# Patient Record
Sex: MALE | Race: WHITE | NOT HISPANIC OR LATINO | Employment: UNEMPLOYED | URBAN - METROPOLITAN AREA
[De-identification: names, ages, dates, MRNs, and addresses within clinical notes are randomized per-mention and may not be internally consistent; named-entity substitution may affect disease eponyms.]

---

## 2017-10-04 ENCOUNTER — HISTORICAL (OUTPATIENT)
Dept: LAB | Facility: HOSPITAL | Age: 62
End: 2017-10-04

## 2017-10-04 LAB
ABS NEUT (OLG): 4.4 X10(3)/MCL (ref 1.5–6.9)
ALBUMIN SERPL-MCNC: 3.7 GM/DL (ref 3.4–5)
ALBUMIN/GLOB SERPL: 0.9 RATIO
ALP SERPL-CCNC: 46 UNIT/L (ref 30–113)
ALT SERPL-CCNC: 92 UNIT/L (ref 10–45)
AST SERPL-CCNC: 48 UNIT/L (ref 15–37)
BILIRUB SERPL-MCNC: 0.4 MG/DL (ref 0.1–0.9)
BILIRUBIN DIRECT+TOT PNL SERPL-MCNC: 0.1 MG/DL (ref 0–0.3)
BILIRUBIN DIRECT+TOT PNL SERPL-MCNC: 0.3 MG/DL
BUN SERPL-MCNC: 14 MG/DL (ref 10–20)
CALCIUM SERPL-MCNC: 8.8 MG/DL (ref 8–10.5)
CHLORIDE SERPL-SCNC: 99 MMOL/L (ref 100–108)
CO2 SERPL-SCNC: 31 MMOL/L (ref 21–35)
CREAT SERPL-MCNC: 1.33 MG/DL (ref 0.7–1.3)
ERYTHROCYTE [DISTWIDTH] IN BLOOD BY AUTOMATED COUNT: 13.7 % (ref 11.5–17)
GLOBULIN SER-MCNC: 3.9 GM/DL
GLUCOSE SERPL-MCNC: 147 MG/DL (ref 75–116)
HCT VFR BLD AUTO: 41.8 % (ref 42–52)
HGB BLD-MCNC: 14.1 GM/DL (ref 14–18)
MCH RBC QN AUTO: 30 PG (ref 27–34)
MCHC RBC AUTO-ENTMCNC: 34 GM/DL (ref 31–36)
MCV RBC AUTO: 90 FL (ref 80–99)
PLATELET # BLD AUTO: 183 X10(3)/MCL (ref 140–400)
PMV BLD AUTO: 12 FL (ref 6.8–10)
POTASSIUM SERPL-SCNC: 2.9 MMOL/L (ref 3.6–5.2)
PROT SERPL-MCNC: 7.6 GM/DL (ref 6.4–8.2)
RBC # BLD AUTO: 4.66 X10(6)/MCL (ref 4.7–6.1)
SODIUM SERPL-SCNC: 139 MMOL/L (ref 135–145)
WBC # SPEC AUTO: 7.9 X10(3)/MCL (ref 4.5–11.5)

## 2018-10-25 ENCOUNTER — HISTORICAL (OUTPATIENT)
Dept: RADIOLOGY | Facility: HOSPITAL | Age: 63
End: 2018-10-25

## 2018-12-12 ENCOUNTER — HISTORICAL (OUTPATIENT)
Dept: LAB | Facility: HOSPITAL | Age: 63
End: 2018-12-12

## 2018-12-12 LAB
ABS NEUT (OLG): 4.8 X10(3)/MCL (ref 1.5–6.9)
ALBUMIN SERPL-MCNC: 3.6 GM/DL (ref 3.4–5)
ALBUMIN/GLOB SERPL: 1 RATIO
ALP SERPL-CCNC: 41 UNIT/L (ref 30–113)
ALT SERPL-CCNC: 41 UNIT/L (ref 10–45)
AST SERPL-CCNC: 22 UNIT/L (ref 15–37)
BILIRUB SERPL-MCNC: 0.5 MG/DL (ref 0.1–0.9)
BILIRUBIN DIRECT+TOT PNL SERPL-MCNC: 0.2 MG/DL (ref 0–0.3)
BILIRUBIN DIRECT+TOT PNL SERPL-MCNC: 0.3 MG/DL
BUN SERPL-MCNC: 18 MG/DL (ref 10–20)
CALCIUM SERPL-MCNC: 8.9 MG/DL (ref 8–10.5)
CHLORIDE SERPL-SCNC: 99 MMOL/L (ref 100–108)
CHOLEST SERPL-MCNC: 231 MG/DL (ref 140–200)
CHOLEST/HDLC SERPL: 4 MG/DL (ref 0–8)
CO2 SERPL-SCNC: 33 MMOL/L (ref 21–35)
CREAT SERPL-MCNC: 1.33 MG/DL (ref 0.7–1.3)
ERYTHROCYTE [DISTWIDTH] IN BLOOD BY AUTOMATED COUNT: 13.7 % (ref 11.5–17)
GLOBULIN SER-MCNC: 3.7 GM/DL
GLUCOSE SERPL-MCNC: 130 MG/DL (ref 75–116)
HCT VFR BLD AUTO: 44.8 % (ref 42–52)
HDLC SERPL-MCNC: 58 MG/DL (ref 35–59)
HGB BLD-MCNC: 14.5 GM/DL (ref 14–18)
LDLC SERPL CALC-MCNC: 136 MG/DL (ref 100–129)
MCH RBC QN AUTO: 30 PG (ref 27–34)
MCHC RBC AUTO-ENTMCNC: 32 GM/DL (ref 31–36)
MCV RBC AUTO: 93 FL (ref 80–99)
PLATELET # BLD AUTO: 178 X10(3)/MCL (ref 140–400)
PMV BLD AUTO: 11.9 FL (ref 6.8–10)
POTASSIUM SERPL-SCNC: 3.3 MMOL/L (ref 3.6–5.2)
PROT SERPL-MCNC: 7.3 GM/DL (ref 6.4–8.2)
RBC # BLD AUTO: 4.82 X10(6)/MCL (ref 4.7–6.1)
SODIUM SERPL-SCNC: 140 MMOL/L (ref 135–145)
TRIGL SERPL-MCNC: 323 MG/DL (ref 35–150)
VLDLC SERPL CALC-MCNC: 65 MG/DL
WBC # SPEC AUTO: 8.4 X10(3)/MCL (ref 4.5–11.5)

## 2018-12-21 ENCOUNTER — HISTORICAL (OUTPATIENT)
Dept: ADMINISTRATIVE | Facility: HOSPITAL | Age: 63
End: 2018-12-21

## 2019-08-16 ENCOUNTER — HISTORICAL (OUTPATIENT)
Dept: SURGERY | Facility: HOSPITAL | Age: 64
End: 2019-08-16

## 2019-12-23 ENCOUNTER — HISTORICAL (OUTPATIENT)
Dept: LAB | Facility: HOSPITAL | Age: 64
End: 2019-12-23

## 2019-12-23 LAB
ALBUMIN SERPL-MCNC: 3.4 GM/DL (ref 3.4–5)
ALBUMIN/GLOB SERPL: 0.9 RATIO
ALP SERPL-CCNC: 38 UNIT/L (ref 30–113)
ALT SERPL-CCNC: 53 UNIT/L (ref 10–45)
AST SERPL-CCNC: 26 UNIT/L (ref 15–37)
BILIRUB SERPL-MCNC: 0.2 MG/DL (ref 0.1–0.9)
BILIRUBIN DIRECT+TOT PNL SERPL-MCNC: 0.1 MG/DL
BILIRUBIN DIRECT+TOT PNL SERPL-MCNC: 0.1 MG/DL (ref 0–0.3)
BUN SERPL-MCNC: 12 MG/DL (ref 10–20)
CALCIUM SERPL-MCNC: 8.9 MG/DL (ref 8–10.5)
CHLORIDE SERPL-SCNC: 104 MMOL/L (ref 100–108)
CHOLEST SERPL-MCNC: 245 MG/DL (ref 140–200)
CHOLEST/HDLC SERPL: 4 MG/DL (ref 0–8)
CO2 SERPL-SCNC: 31 MMOL/L (ref 21–35)
CREAT SERPL-MCNC: 0.92 MG/DL (ref 0.7–1.3)
GLOBULIN SER-MCNC: 3.6 GM/DL
GLUCOSE SERPL-MCNC: 99 MG/DL (ref 75–116)
HDLC SERPL-MCNC: 58 MG/DL (ref 35–59)
LDLC SERPL CALC-MCNC: 170 MG/DL (ref 100–129)
POTASSIUM SERPL-SCNC: 3.6 MMOL/L (ref 3.6–5.2)
PROT SERPL-MCNC: 7 GM/DL (ref 6.4–8.2)
PSA SERPL-MCNC: 2.25 NG/ML (ref 0–4)
SODIUM SERPL-SCNC: 144 MMOL/L (ref 135–145)
TRIGL SERPL-MCNC: 196 MG/DL (ref 35–150)
VLDLC SERPL CALC-MCNC: 39 MG/DL

## 2019-12-30 ENCOUNTER — HISTORICAL (OUTPATIENT)
Dept: LAB | Facility: HOSPITAL | Age: 64
End: 2019-12-30

## 2019-12-30 LAB
BUN SERPL-MCNC: 18 MG/DL (ref 10–20)
CALCIUM SERPL-MCNC: 9.2 MG/DL (ref 8–10.5)
CHLORIDE SERPL-SCNC: 101 MMOL/L (ref 100–108)
CO2 SERPL-SCNC: 34 MMOL/L (ref 21–35)
CREAT SERPL-MCNC: 0.98 MG/DL (ref 0.7–1.3)
CREAT/UREA NIT SERPL: 18
GLUCOSE SERPL-MCNC: 143 MG/DL (ref 75–116)
POTASSIUM SERPL-SCNC: 3.2 MMOL/L (ref 3.6–5.2)
SODIUM SERPL-SCNC: 140 MMOL/L (ref 135–145)

## 2020-11-09 ENCOUNTER — HISTORICAL (OUTPATIENT)
Dept: LAB | Facility: HOSPITAL | Age: 65
End: 2020-11-09

## 2020-11-09 LAB
ABS NEUT (OLG): 3.6 X10(3)/MCL (ref 1.5–6.9)
ALBUMIN SERPL-MCNC: 3.9 GM/DL (ref 3.4–4.8)
ALBUMIN/GLOB SERPL: 1.1 RATIO (ref 1.1–2)
ALP SERPL-CCNC: 39 UNIT/L (ref 40–150)
ALT SERPL-CCNC: 42 UNIT/L (ref 0–55)
AST SERPL-CCNC: 26 UNIT/L (ref 5–34)
BILIRUB SERPL-MCNC: 0.6 MG/DL
BILIRUBIN DIRECT+TOT PNL SERPL-MCNC: 0.2 MG/DL (ref 0–0.5)
BILIRUBIN DIRECT+TOT PNL SERPL-MCNC: 0.4 MG/DL (ref 0–0.8)
BUN SERPL-MCNC: 14 MG/DL (ref 8.4–25.7)
CALCIUM SERPL-MCNC: 9.3 MG/DL (ref 8.8–10)
CHLORIDE SERPL-SCNC: 96 MMOL/L (ref 98–107)
CHOLEST SERPL-MCNC: 250 MG/DL
CHOLEST/HDLC SERPL: 5 {RATIO} (ref 0–5)
CO2 SERPL-SCNC: 34 MMOL/L (ref 23–31)
CREAT SERPL-MCNC: 1.06 MG/DL (ref 0.73–1.18)
ERYTHROCYTE [DISTWIDTH] IN BLOOD BY AUTOMATED COUNT: 13.2 % (ref 11.5–17)
GLOBULIN SER-MCNC: 3.6 GM/DL (ref 2.4–3.5)
GLUCOSE SERPL-MCNC: 118 MG/DL (ref 82–115)
HCT VFR BLD AUTO: 46.9 % (ref 42–52)
HDLC SERPL-MCNC: 49 MG/DL (ref 35–60)
HGB BLD-MCNC: 15.5 GM/DL (ref 14–18)
LDLC SERPL CALC-MCNC: 98 MG/DL (ref 50–140)
MCH RBC QN AUTO: 29 PG (ref 27–34)
MCHC RBC AUTO-ENTMCNC: 33 GM/DL (ref 31–36)
MCV RBC AUTO: 89 FL (ref 80–99)
PLATELET # BLD AUTO: 146 X10(3)/MCL (ref 140–400)
PMV BLD AUTO: 11.7 FL (ref 6.8–10)
POTASSIUM SERPL-SCNC: 3.3 MMOL/L (ref 3.5–5.1)
PROT SERPL-MCNC: 7.5 GM/DL (ref 5.8–7.6)
RBC # BLD AUTO: 5.29 X10(6)/MCL (ref 4.7–6.1)
SODIUM SERPL-SCNC: 140 MMOL/L (ref 136–145)
TRIGL SERPL-MCNC: 515 MG/DL (ref 34–140)
VLDLC SERPL CALC-MCNC: 103 MG/DL
WBC # SPEC AUTO: 7 X10(3)/MCL (ref 4.5–11.5)

## 2022-01-12 ENCOUNTER — HISTORICAL (OUTPATIENT)
Dept: RADIOLOGY | Facility: HOSPITAL | Age: 67
End: 2022-01-12

## 2022-03-15 ENCOUNTER — HISTORICAL (OUTPATIENT)
Dept: RESPIRATORY THERAPY | Facility: HOSPITAL | Age: 67
End: 2022-03-15

## 2022-03-15 ENCOUNTER — HISTORICAL (OUTPATIENT)
Dept: ADMINISTRATIVE | Facility: HOSPITAL | Age: 67
End: 2022-03-15

## 2022-03-15 ENCOUNTER — HISTORICAL (OUTPATIENT)
Dept: RADIOLOGY | Facility: HOSPITAL | Age: 67
End: 2022-03-15

## 2022-03-23 ENCOUNTER — HISTORICAL (OUTPATIENT)
Dept: LAB | Facility: HOSPITAL | Age: 67
End: 2022-03-23

## 2022-03-23 LAB
ABS NEUT (OLG): 2.6 (ref 1.5–6.9)
ALBUMIN SERPL-MCNC: 3.9 G/DL (ref 3.4–4.8)
ALBUMIN/GLOB SERPL: 1.3 {RATIO} (ref 1.1–2)
ALP SERPL-CCNC: 31 U/L (ref 40–150)
ALT SERPL-CCNC: 34 U/L (ref 0–55)
AST SERPL-CCNC: 28 U/L (ref 5–34)
BASOPHILS # BLD AUTO: 0 10*3/UL (ref 0–0.1)
BASOPHILS NFR BLD AUTO: 1 % (ref 0–1)
BILIRUB SERPL-MCNC: 0.4 MG/DL
BILIRUBIN DIRECT+TOT PNL SERPL-MCNC: 0.2 (ref 0–0.5)
BILIRUBIN DIRECT+TOT PNL SERPL-MCNC: 0.2 (ref 0–0.8)
BUN SERPL-MCNC: 18 MG/DL (ref 8.4–25.7)
CALCIUM SERPL-MCNC: 9.4 MG/DL (ref 8.7–10.5)
CHLORIDE SERPL-SCNC: 103 MMOL/L (ref 98–107)
CHOLEST SERPL-MCNC: 226 MG/DL
CHOLEST/HDLC SERPL: 5 {RATIO} (ref 0–5)
CO2 SERPL-SCNC: 36 MMOL/L (ref 23–31)
CREAT SERPL-MCNC: 1.18 MG/DL (ref 0.73–1.18)
EOSINOPHIL # BLD AUTO: 0.4 10*3/UL (ref 0–0.6)
EOSINOPHIL NFR BLD AUTO: 7 % (ref 0–5)
ERYTHROCYTE [DISTWIDTH] IN BLOOD BY AUTOMATED COUNT: 13 % (ref 11.5–17)
GLOBULIN SER-MCNC: 3.1 G/DL (ref 2.4–3.5)
GLUCOSE SERPL-MCNC: 97 MG/DL (ref 82–115)
HCT VFR BLD AUTO: 44.7 % (ref 42–52)
HDLC SERPL-MCNC: 46 MG/DL (ref 35–60)
HEMOLYSIS INTERF INDEX SERPL-ACNC: 1
HGB BLD-MCNC: 14.5 G/DL (ref 14–18)
ICTERIC INTERF INDEX SERPL-ACNC: 0
IMM GRANULOCYTES # BLD AUTO: 0.01 10*3/UL (ref 0–0.02)
IMM GRANULOCYTES NFR BLD AUTO: 0.2 % (ref 0–0.43)
LDLC SERPL CALC-MCNC: 126 MG/DL (ref 50–140)
LIPEMIC INTERF INDEX SERPL-ACNC: 4
LYMPHOCYTES # BLD AUTO: 2.5 10*3/UL (ref 0.5–4.1)
LYMPHOCYTES NFR BLD AUTO: 41 % (ref 15–40)
MANUAL DIFF? (OHS): NO
MCH RBC QN AUTO: 30 PG (ref 27–34)
MCHC RBC AUTO-ENTMCNC: 32 G/DL (ref 31–36)
MCV RBC AUTO: 92 FL (ref 80–99)
MONOCYTES # BLD AUTO: 0.6 10*3/UL (ref 0–1.1)
MONOCYTES NFR BLD AUTO: 10 % (ref 4–12)
NEUTROPHILS # BLD AUTO: 2.6 10*3/UL (ref 1.5–6.9)
NEUTROPHILS NFR BLD AUTO: 42 % (ref 43–75)
PLATELET # BLD AUTO: 162 10*3/UL (ref 140–400)
PMV BLD AUTO: 12.2 FL (ref 6.8–10)
POTASSIUM SERPL-SCNC: 3.6 MMOL/L (ref 3.5–5.1)
PROT SERPL-MCNC: 7 G/DL (ref 5.8–7.6)
PSA SERPL-MCNC: 2.36 NG/ML
RBC # BLD AUTO: 4.86 10*6/UL (ref 4.7–6.1)
SODIUM SERPL-SCNC: 146 MMOL/L (ref 136–145)
TESTOST SERPL-MCNC: 365.19 NG/DL (ref 220.91–715.81)
TRIGL SERPL-MCNC: 272 MG/DL (ref 34–140)
TSH SERPL-ACNC: 1.79 M[IU]/L (ref 0.35–4.94)
VLDLC SERPL CALC-MCNC: 54 MG/DL
WBC # SPEC AUTO: 6.2 10*3/UL (ref 4.5–11.5)

## 2022-04-11 ENCOUNTER — HISTORICAL (OUTPATIENT)
Dept: ADMINISTRATIVE | Facility: HOSPITAL | Age: 67
End: 2022-04-11

## 2022-04-27 VITALS
HEIGHT: 70 IN | BODY MASS INDEX: 31.07 KG/M2 | OXYGEN SATURATION: 97 % | WEIGHT: 217 LBS | SYSTOLIC BLOOD PRESSURE: 126 MMHG | DIASTOLIC BLOOD PRESSURE: 70 MMHG

## 2022-04-30 NOTE — OP NOTE
DATE OF SURGERY:    08/16/2019    SURGEON:  Yariel Singer MD    PREOPERATIVE DIAGNOSIS:  Lumbar spondylosis, lumbosacral neuritis.    POSTOPERATIVE DIAGNOSIS:  Lumbar spondylosis, lumbosacral neuritis.    PROCEDURE:  Lumbar epidural steroid injection, caudal approach with catheter placement.    PROCEDURE IN DETAIL:  After proper consent, patient was brought to procedure room.  IV access was maintained.  Cardiac monitors were placed and operational.  Patient was then placed in a prone position, and the area over the sacral hiatus was prepped and draped in wide sterile fashion.  After 3 cc of 1% lidocaine skin anesthesia, an 18-gauge Touhy needle was advanced to the epidural space under fluoroscopic guidance.  Placement verified using fluoroscopy and Omnipaque.  Trivedi catheter was then passed without difficulty to L5-S1.  Epidurogram was performed.  No sign of intravascular, intrathecal, or perineural injection.  Images were saved.  A catheter was aspirated negatively and tested with 2 cc of 1% lidocaine with epi without sequelae.  After a period of observation, an injectate consisting of 40 mg Depo-Medrol, 5 mg Decadron, 1 cc of 0.25% bupivacaine, and 8 cc normal saline was prepared.  This was injected in a slow incremental fashion.  Patient tolerated procedure well.  Catheter was pulled.  Tip was intact.  Needle path was disrupted.  The patient was sent to the holding area for 30 minutes of observation.  Discharged to home in a stable condition.        ______________________________  Yariel Singer MD    JTG/UV  DD:  10/03/2019  Time:  01:53PM  DT:  10/03/2019  Time:  02:08PM  Job #:  [id number]

## 2022-04-30 NOTE — OP NOTE
DATE OF SURGERY:        SURGEON:  Yariel Singer MD    PREOPERATIVE DIAGNOSIS:  Lumbar degenerative disk disease.    POSTOPERATIVE DIAGNOSIS:  Lumbar degenerative disk disease.    PROCEDURE:  Lumbar epidural steroid injection caudal approach with catheter placement.    PROCEDURE IN DETAIL:  After appropriate consent, patient was brought to procedure room.  IV access was maintained.  Cardiac monitors placed and operational.  The patient was then placed in a prone position.  Area over the sacral hiatus was prepped and draped in wide sterile fashion.  After 5 cc of 1% lidocaine for skin anesthesia, an 18-gauge Touhy needle was advanced to the epidural space under fluoroscopic guidance.  Placement verified using fluoroscopy and Omnipaque.  Trivedi catheter was then passed without difficulty to L5-S1.  Epidurogram was performed.  Bilateral flow seen.  No sign of intravascular intrathecal perineural injection.  Catheter was aspirated negatively and tested with 2 cc 1.5% lidocaine with epinephrine without sequelae.  After a period of observation, an injectate consisting of 80 mg Depo-Medrol, 1 cc of 0.25% bupivacaine and 6 cc of normal saline was prepared.  This was injected in a slow and incremental fashion.  Patient reports pressure paresthesias actually reproduced his pain but it dissipated rapidly.  Catheter was pulled.  Tip was intact.  Needle path disrupted.  Patient sent to the holding area for 30 minutes of observation.  Discharged to home in stable condition.        ______________________________  Yariel Singer MD    JTG/UW  DD:  12/21/2018  Time:  09:36AM  DT:  12/21/2018  Time:  01:42PM  Job #:  [id number]

## 2022-11-29 DIAGNOSIS — I82.593 CHRONIC EMBOLISM AND THROMBOSIS OF OTHER SPECIFIED DEEP VEIN OF LOWER EXTREMITY, BILATERAL: Primary | ICD-10-CM

## 2023-02-02 ENCOUNTER — LAB VISIT (OUTPATIENT)
Dept: LAB | Facility: HOSPITAL | Age: 68
End: 2023-02-02
Attending: FAMILY MEDICINE
Payer: MEDICARE

## 2023-02-02 DIAGNOSIS — E78.5 HYPERLIPIDEMIA, UNSPECIFIED HYPERLIPIDEMIA TYPE: Primary | ICD-10-CM

## 2023-02-02 DIAGNOSIS — I11.9 MALIGNANT HYPERTENSIVE HEART DISEASE WITHOUT HEART FAILURE: ICD-10-CM

## 2023-02-02 LAB
ALBUMIN SERPL-MCNC: 3.3 G/DL (ref 3.4–4.8)
ALBUMIN/GLOB SERPL: 1 RATIO (ref 1.1–2)
ALP SERPL-CCNC: 39 UNIT/L (ref 40–150)
ALT SERPL-CCNC: 22 UNIT/L (ref 0–55)
AST SERPL-CCNC: 16 UNIT/L (ref 5–34)
BASOPHILS # BLD AUTO: 0.03 X10(3)/MCL (ref 0–0.2)
BASOPHILS NFR BLD AUTO: 0.3 %
BILIRUBIN DIRECT+TOT PNL SERPL-MCNC: 1.2 MG/DL
BUN SERPL-MCNC: 22 MG/DL (ref 8.4–25.7)
CALCIUM SERPL-MCNC: 9.4 MG/DL (ref 8.8–10)
CHLORIDE SERPL-SCNC: 96 MMOL/L (ref 98–107)
CHOLEST SERPL-MCNC: 150 MG/DL
CHOLEST/HDLC SERPL: 3 {RATIO} (ref 0–5)
CO2 SERPL-SCNC: 33 MMOL/L (ref 23–31)
CREAT SERPL-MCNC: 1.28 MG/DL (ref 0.73–1.18)
EOSINOPHIL # BLD AUTO: 0.28 X10(3)/MCL (ref 0–0.9)
EOSINOPHIL NFR BLD AUTO: 2.6 %
ERYTHROCYTE [DISTWIDTH] IN BLOOD BY AUTOMATED COUNT: 14.6 % (ref 11.5–17)
GFR SERPLBLD CREATININE-BSD FMLA CKD-EPI: >60 MLS/MIN/1.73/M2
GLOBULIN SER-MCNC: 3.2 GM/DL (ref 2.4–3.5)
GLUCOSE SERPL-MCNC: 98 MG/DL (ref 82–115)
HCT VFR BLD AUTO: 47.8 % (ref 42–52)
HDLC SERPL-MCNC: 55 MG/DL (ref 35–60)
HGB BLD-MCNC: 15.2 GM/DL (ref 14–18)
IMM GRANULOCYTES # BLD AUTO: 0.04 X10(3)/MCL (ref 0–0.04)
IMM GRANULOCYTES NFR BLD AUTO: 0.4 %
LDLC SERPL CALC-MCNC: 77 MG/DL (ref 50–140)
LYMPHOCYTES # BLD AUTO: 3.16 X10(3)/MCL (ref 0.6–4.6)
LYMPHOCYTES NFR BLD AUTO: 28.9 %
MCH RBC QN AUTO: 27.7 PG
MCHC RBC AUTO-ENTMCNC: 31.8 MG/DL (ref 33–36)
MCV RBC AUTO: 87.2 FL (ref 80–94)
MONOCYTES # BLD AUTO: 1.11 X10(3)/MCL (ref 0.1–1.3)
MONOCYTES NFR BLD AUTO: 10.1 %
NEUTROPHILS # BLD AUTO: 6.33 X10(3)/MCL (ref 2.1–9.2)
NEUTROPHILS NFR BLD AUTO: 57.7 %
PLATELET # BLD AUTO: 159 X10(3)/MCL (ref 130–400)
PMV BLD AUTO: 12.1 FL (ref 7.4–10.4)
POTASSIUM SERPL-SCNC: 4.2 MMOL/L (ref 3.5–5.1)
PROT SERPL-MCNC: 6.5 GM/DL (ref 5.8–7.6)
RBC # BLD AUTO: 5.48 X10(6)/MCL (ref 4.7–6.1)
SODIUM SERPL-SCNC: 138 MMOL/L (ref 136–145)
TRIGL SERPL-MCNC: 88 MG/DL (ref 34–140)
TSH SERPL-ACNC: 1.77 UIU/ML (ref 0.35–4.94)
VLDLC SERPL CALC-MCNC: 18 MG/DL
WBC # SPEC AUTO: 11 X10(3)/MCL (ref 4.5–11.5)

## 2023-02-02 PROCEDURE — 80053 COMPREHEN METABOLIC PANEL: CPT

## 2023-02-02 PROCEDURE — 85025 COMPLETE CBC W/AUTO DIFF WBC: CPT

## 2023-02-02 PROCEDURE — 84443 ASSAY THYROID STIM HORMONE: CPT

## 2023-02-02 PROCEDURE — 80061 LIPID PANEL: CPT

## 2023-02-02 PROCEDURE — 36415 COLL VENOUS BLD VENIPUNCTURE: CPT

## 2023-02-07 ENCOUNTER — HOSPITAL ENCOUNTER (OUTPATIENT)
Dept: RADIOLOGY | Facility: HOSPITAL | Age: 68
Discharge: HOME | End: 2023-02-07
Attending: FAMILY MEDICINE
Payer: MEDICARE

## 2023-02-07 DIAGNOSIS — I82.593 CHRONIC EMBOLISM AND THROMBOSIS OF OTHER SPECIFIED DEEP VEIN OF LOWER EXTREMITY, BILATERAL: ICD-10-CM

## 2023-02-07 PROCEDURE — 93925 LOWER EXTREMITY STUDY: CPT | Mod: TC

## 2023-02-23 ENCOUNTER — HOSPITAL ENCOUNTER (OUTPATIENT)
Dept: WOUND CARE | Facility: HOSPITAL | Age: 68
Discharge: HOME | End: 2023-02-23
Attending: FAMILY MEDICINE
Payer: MEDICARE

## 2023-02-23 VITALS
DIASTOLIC BLOOD PRESSURE: 79 MMHG | BODY MASS INDEX: 29.35 KG/M2 | SYSTOLIC BLOOD PRESSURE: 138 MMHG | HEART RATE: 67 BPM | WEIGHT: 205 LBS | RESPIRATION RATE: 18 BRPM | HEIGHT: 70 IN

## 2023-02-23 DIAGNOSIS — S91.109A OPEN WOUND OF TOE OF LEFT FOOT: Primary | ICD-10-CM

## 2023-02-23 DIAGNOSIS — S91.301A OPEN WOUND OF RIGHT FOOT, INITIAL ENCOUNTER: ICD-10-CM

## 2023-02-23 PROCEDURE — 97597 DBRDMT OPN WND 1ST 20 CM/<: CPT

## 2023-02-23 PROCEDURE — 27000999 HC MEDICAL RECORD PHOTO DOCUMENTATION

## 2023-02-23 PROCEDURE — 99213 OFFICE O/P EST LOW 20 MIN: CPT | Mod: 25

## 2023-02-23 RX ORDER — HYDROCODONE BITARTRATE AND ACETAMINOPHEN 10; 325 MG/1; MG/1
1 TABLET ORAL NIGHTLY
COMMUNITY
Start: 2023-01-26

## 2023-02-23 RX ORDER — TIZANIDINE 4 MG/1
TABLET ORAL
COMMUNITY
Start: 2023-01-11

## 2023-02-23 RX ORDER — FUROSEMIDE 20 MG/1
TABLET ORAL
COMMUNITY

## 2023-02-23 RX ORDER — AMLODIPINE AND BENAZEPRIL HYDROCHLORIDE 10; 40 MG/1; MG/1
CAPSULE ORAL
COMMUNITY

## 2023-02-23 RX ORDER — GABAPENTIN 600 MG/1
TABLET ORAL
COMMUNITY

## 2023-02-23 RX ORDER — HYDROCHLOROTHIAZIDE 25 MG/1
TABLET ORAL
COMMUNITY

## 2023-02-23 RX ORDER — MUPIROCIN 20 MG/G
OINTMENT TOPICAL
COMMUNITY
Start: 2022-11-28

## 2023-02-23 RX ORDER — TRAZODONE HYDROCHLORIDE 100 MG/1
TABLET ORAL
COMMUNITY

## 2023-02-23 RX ORDER — POTASSIUM CHLORIDE 20 MEQ/1
TABLET, EXTENDED RELEASE ORAL
COMMUNITY

## 2023-02-23 RX ORDER — BISOPROLOL FUMARATE 5 MG/1
TABLET, FILM COATED ORAL
COMMUNITY

## 2023-02-23 RX ORDER — ROSUVASTATIN CALCIUM 5 MG/1
TABLET, COATED ORAL
COMMUNITY
Start: 2023-01-30

## 2023-02-23 RX ORDER — CELECOXIB 200 MG/1
CAPSULE ORAL
COMMUNITY

## 2023-02-23 RX ORDER — FENOFIBRATE 48 MG/1
TABLET, FILM COATED ORAL
COMMUNITY

## 2023-02-23 NOTE — PROCEDURES
Debridement    Date/Time: 2/23/2023 10:40 AM  Performed by: Ron Chairez MD  Authorized by: Ron Chairez MD     Consent Done?:  Yes (Verbal)  Local anesthesia used?: No      Wound Details:    Location:  Left foot    Location:  Left 1st Toe    Type of Debridement:  Non-excisional       Length (cm):  1       Area (sq cm):  0.4       Width (cm):  0.4       Percent Debrided (%):  80       Depth (cm):  0.3       Total Area Debrided (sq cm):  0.32    Depth of debridement:  Epidermis/Dermis    Tissue debrided:  Epidermis    Devitalized tissue debrided:  Callus, Biofilm, Exudate and Slough    Instruments:  Curette (dermal)    Additional wounds:  1    2nd Wound Details:     Location:  Right foot    Location:  Right 1st Toe    Location:  Right 1st Toe    Type of Debridement:  Non-excisional       Length (cm):  1.8       Area (sq cm):  0.9       Width (cm):  0.5       Percent Debrided (%):  80       Depth (cm):  0.2       Total Area Debrided (sq cm):  0.72    Depth of debridement:  Epidermis/Dermis    Tissue debrided:  Epidermis    Devitalized tissue debrided:  Callus, Biofilm, Exudate and Slough    Instruments:  Curette (dermal)    Bleeding:  None  Patient tolerance:  Patient tolerated the procedure well with no immediate complications
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
## 2023-03-02 ENCOUNTER — HOSPITAL ENCOUNTER (OUTPATIENT)
Dept: WOUND CARE | Facility: HOSPITAL | Age: 68
Discharge: HOME | End: 2023-03-02
Attending: FAMILY MEDICINE
Payer: MEDICARE

## 2023-03-02 VITALS
RESPIRATION RATE: 18 BRPM | HEART RATE: 58 BPM | BODY MASS INDEX: 29.35 KG/M2 | DIASTOLIC BLOOD PRESSURE: 89 MMHG | HEIGHT: 70 IN | SYSTOLIC BLOOD PRESSURE: 119 MMHG | WEIGHT: 205 LBS

## 2023-03-02 DIAGNOSIS — S91.109A OPEN WOUND OF TOE OF LEFT FOOT: Primary | ICD-10-CM

## 2023-03-02 PROCEDURE — 27000999 HC MEDICAL RECORD PHOTO DOCUMENTATION

## 2023-03-02 PROCEDURE — 99213 OFFICE O/P EST LOW 20 MIN: CPT | Mod: 25

## 2023-03-02 PROCEDURE — 97597 DBRDMT OPN WND 1ST 20 CM/<: CPT

## 2023-03-02 NOTE — PROGRESS NOTES
"   Subjective:       Patient ID: Paulo Dickerson is a 67 y.o. male.    Chief Complaint: Wound Care (Right 1st toe #1/Left 1st toe #2)    67-year-old white male, who is here for follow up of his great toe ulcers.  The right toe distal ulcer has become callus, with no open wound today.  The left great toe ulcer is very small, improvements with length and width, but no improvement with depth.  He has been using collagen.  Noticed callus to right 2nd toe which he trimmed    Review of Systems   Constitutional: Negative.    Respiratory: Negative.     Cardiovascular: Negative.    Skin:         As documented in the HPI.   All other systems reviewed and are negative.      Objective:      Vitals:    03/02/23 1121   BP: 119/89   BP Location: Right arm   Patient Position: Sitting   Pulse: (!) 58   Resp: 18   Weight: 93 kg (205 lb)   Height: 5' 10" (1.778 m)        Physical Exam  Vitals and nursing note reviewed.   Constitutional:       Appearance: Normal appearance.   Cardiovascular:      Rate and Rhythm: Normal rate.   Pulmonary:      Effort: Pulmonary effort is normal.   Skin:     General: Skin is warm and dry.      Comments: The ulceration on the right great toe has healed.  There is only callus left.  I did not do debridement today.  The callus is superficial.  The left great toe wound appears much improved, with measurements improved.  I did a selective debridement, with a martini curette.  I removed some biofilm, exudate, slough.  There was some maceration, but no callus.   Neurological:      Mental Status: He is alert.     Left plantar ulcer, post debridement    Left great toe plantar ulcer, prior to debridement    Right great toe, no open wound.       Altered Skin Integrity 02/23/23 1136 Right plantar Toe, first #1 (Active)   02/23/23 1136   Altered Skin Integrity Present on Admission - Did Patient arrive to the hospital with altered skin?: yes   Side: Right   Orientation: plantar   Location: Toe, first   Wound Number: #1 "   Is this injury device related?:    Primary Wound Type:    Description of Altered Skin Integrity:    Ankle-Brachial Index:    Pulses:    Removal Indication and Assessment:    Wound Outcome:    (Retired) Wound Length (cm):    (Retired) Wound Width (cm):    (Retired) Depth (cm):    Wound Description (Comments):    Removal Indications:    Dressing Appearance Moist drainage 03/02/23 1126   Drainage Amount Moderate 03/02/23 1126   Drainage Characteristics/Odor Serosanguineous 03/02/23 1126   Appearance Red;Maroon;Fibrin;Moist 03/02/23 1126   Tissue loss description Full thickness 03/02/23 1126   Periwound Area [place] 03/02/23 1126   Wound Edges Callused;Undefined 03/02/23 1126   Wound Length (cm) 1.2 cm 03/02/23 1126   Wound Width (cm) 0.7 cm 03/02/23 1126   Wound Depth (cm) 0.2 cm 03/02/23 1126   Wound Volume (cm^3) 0.168 cm^3 03/02/23 1126   Wound Surface Area (cm^2) 0.84 cm^2 03/02/23 1126   Care Cleansed with:;Wound cleanser 03/02/23 1126   Dressing Applied 03/02/23 1126            Altered Skin Integrity 02/23/23 1136 Left plantar Toe, first #2 (Active)   02/23/23 1136   Altered Skin Integrity Present on Admission - Did Patient arrive to the hospital with altered skin?: yes   Side: Left   Orientation: plantar   Location: Toe, first   Wound Number: #2   Is this injury device related?:    Primary Wound Type:    Description of Altered Skin Integrity:    Ankle-Brachial Index:    Pulses:    Removal Indication and Assessment:    Wound Outcome:    (Retired) Wound Length (cm):    (Retired) Wound Width (cm):    (Retired) Depth (cm):    Wound Description (Comments):    Removal Indications:    Dressing Appearance Moist drainage 03/02/23 1126   Drainage Amount Moderate 03/02/23 1126   Drainage Characteristics/Odor Serosanguineous 03/02/23 1126   Appearance Pink;White;Slough;Moist 03/02/23 1126   Tissue loss description Full thickness 03/02/23 1126   Periwound Area Moist;Pale white 03/02/23 1126   Wound Edges Open 03/02/23 1126    Wound Length (cm) 0.6 cm 03/02/23 1126   Wound Width (cm) 0.3 cm 03/02/23 1126   Wound Depth (cm) 0.3 cm 03/02/23 1126   Wound Volume (cm^3) 0.054 cm^3 03/02/23 1126   Wound Surface Area (cm^2) 0.18 cm^2 03/02/23 1126   Care Cleansed with:;Wound cleanser 03/02/23 1126   Dressing Applied 03/02/23 1126         Assessment:         ICD-10-CM ICD-9-CM   1. Open wound of toe of left foot  S91.109A 893.0         Procedures:   Excisional debridement performed:  [] Yes [x] No   Selective debridement performed:  [x] Yes [] No   Mechanical debridement performed:  [] Yes [x] No   Silver nitrate applied :    [] Yes [x] No   Labs ordered this visit:   [] Yes [x] No   Imaging ordered this visit:   [] Yes [x] No   Tissue pathology and/or culture taken:  [] Yes [x] No     Procedures:  HOME HEALTH AGENCY: none  TIMES PER WEEK/DAYS:  ORDERS:  Right plantar 1st toe #1: cleanse daily, keep open to air  Left plantar 1st toe #2: **DO NOT REMOVE BELOW STERI STRIPS (powdered collagen, adaptic)** may change silver alginate, kerlix and coban daily for drainage. On Sunday remove entire dressing, cleanse with wound cleanser, apply silver alginate to wound bed and wrap with kerlix and coban, change this dressing daily    Patient Orders:    Returned to the clinic in 1 week.

## 2023-03-02 NOTE — PROCEDURES
"Debridement    Date/Time: 3/2/2023 10:40 AM  Performed by: Ron Chairez MD  Authorized by: Ron Chairez MD     Time out: Immediately prior to procedure a "time out" was called to verify the correct patient, procedure, equipment, support staff and site/side marked as required.    Consent Done?:  Yes (Verbal)  Local anesthesia used?: No      Wound Details:    Location:  Left foot    Location:  Left 1st Toe    Type of Debridement:  Non-excisional       Length (cm):  0.6       Area (sq cm):  0.18       Width (cm):  0.3       Percent Debrided (%):  80       Depth (cm):  0.3       Total Area Debrided (sq cm):  0.14    Depth of debridement:  Epidermis/Dermis    Tissue debrided:  Epidermis    Devitalized tissue debrided:  Biofilm, Exudate and Slough    Instruments:  Curette (martini)    Bleeding:  None  Patient tolerance:  Patient tolerated the procedure well with no immediate complications  "

## 2023-03-09 ENCOUNTER — HOSPITAL ENCOUNTER (OUTPATIENT)
Dept: WOUND CARE | Facility: HOSPITAL | Age: 68
Discharge: HOME | End: 2023-03-09
Attending: FAMILY MEDICINE
Payer: MEDICARE

## 2023-03-09 VITALS
SYSTOLIC BLOOD PRESSURE: 148 MMHG | WEIGHT: 205 LBS | DIASTOLIC BLOOD PRESSURE: 82 MMHG | HEIGHT: 70 IN | HEART RATE: 64 BPM | BODY MASS INDEX: 29.35 KG/M2 | RESPIRATION RATE: 18 BRPM

## 2023-03-09 DIAGNOSIS — S91.301S: ICD-10-CM

## 2023-03-09 DIAGNOSIS — S91.109A OPEN WOUND OF TOE OF LEFT FOOT: Primary | ICD-10-CM

## 2023-03-09 PROCEDURE — 97597 DBRDMT OPN WND 1ST 20 CM/<: CPT

## 2023-03-09 PROCEDURE — 27000999 HC MEDICAL RECORD PHOTO DOCUMENTATION

## 2023-03-09 PROCEDURE — 99213 OFFICE O/P EST LOW 20 MIN: CPT | Mod: 25

## 2023-03-09 NOTE — PROGRESS NOTES
"   Subjective:       Patient ID: Paulo Dickerson is a 67 y.o. male.    Chief Complaint: Wound Care (Right plantar first toe /Left plantar first toe )    67-year-old white male, who is here for follow up of 2 open wounds, on each left and right great toes.  The wounds are improving.  The wound on the right great toe has just about healed.  There is some callus left.  The wound on the left great toe is very small, measuring smaller.  Getting ready to leave to go out of the country [place] at least 2 months.    Review of Systems   Constitutional: Negative.    Respiratory: Negative.     Cardiovascular: Negative.    Skin:         As documented in the HPI.   All other systems reviewed and are negative.      Objective:      Vitals:    03/09/23 1032   BP: (!) 148/82   BP Location: Left arm   Patient Position: Sitting   Pulse: 64   Resp: 18   Weight: 93 kg (205 lb)   Height: 5' 10" (1.778 m)        Physical Exam  Vitals reviewed.   Constitutional:       Appearance: Normal appearance.   Cardiovascular:      Rate and Rhythm: Normal rate.   Pulmonary:      Effort: Pulmonary effort is normal.   Skin:     General: Skin is warm and dry.      Comments: The wound on the right great toe has just about healed.  I used a dermal curette, and I removed some callus and a small amount of biofilm.  The left great toe is very small, with improved measurements.  I removed some callus surrounding the wound, and some biofilm, with a dermal curette.  There was no pain or bleeding.   Neurological:      Mental Status: He is alert.     Right great toe, prior to debridement    Right great toe, post debridement    Left great toe, prior to debridement    Left great toe, post debridement       Altered Skin Integrity 02/23/23 1136 Right plantar Toe, first #1 (Active)   02/23/23 1136   Altered Skin Integrity Present on Admission - Did Patient arrive to the hospital with altered skin?: yes   Side: Right   Orientation: plantar   Location: Toe, first   Wound " Number: #1   Is this injury device related?:    Primary Wound Type:    Description of Altered Skin Integrity:    Ankle-Brachial Index:    Pulses:    Removal Indication and Assessment:    Wound Outcome:    (Retired) Wound Length (cm):    (Retired) Wound Width (cm):    (Retired) Depth (cm):    Wound Description (Comments):    Removal Indications:    Dressing Appearance Open to air 03/09/23 1033   Drainage Amount None 03/09/23 1033   Appearance Dry;Fibrin;Ecchymotic 03/09/23 1033   Tissue loss description Full thickness 03/09/23 1033   Periwound Area Intact 03/09/23 1033   Wound Edges Callused 03/09/23 1033   Wound Length (cm) 0 cm 03/09/23 1053   Wound Width (cm) 0 cm 03/09/23 1053   Wound Depth (cm) 0 cm 03/09/23 1053   Wound Volume (cm^3) 0 cm^3 03/09/23 1053   Wound Surface Area (cm^2) 0 cm^2 03/09/23 1053   Care Debrided 03/09/23 1053            Altered Skin Integrity 02/23/23 1136 Left plantar Toe, first #2 (Active)   02/23/23 1136   Altered Skin Integrity Present on Admission - Did Patient arrive to the hospital with altered skin?: yes   Side: Left   Orientation: plantar   Location: Toe, first   Wound Number: #2   Is this injury device related?:    Primary Wound Type:    Description of Altered Skin Integrity:    Ankle-Brachial Index:    Pulses:    Removal Indication and Assessment:    Wound Outcome:    (Retired) Wound Length (cm):    (Retired) Wound Width (cm):    (Retired) Depth (cm):    Wound Description (Comments):    Removal Indications:    Dressing Appearance Moist drainage 03/09/23 1033   Drainage Characteristics/Odor Serosanguineous 03/09/23 1033   Appearance Moist;Pink;Granulating 03/09/23 1033   Tissue loss description Full thickness 03/09/23 1033   Periwound Area Moist 03/09/23 1033   Wound Edges Open 03/09/23 1033   Wound Length (cm) 0.7 cm 03/09/23 1033   Wound Width (cm) 0.2 cm 03/09/23 1033   Wound Depth (cm) 0.3 cm 03/09/23 1033   Wound Volume (cm^3) 0.042 cm^3 03/09/23 1033   Wound Surface Area  "(cm^2) 0.14 cm^2 03/09/23 1033   Undermining (depth (cm)/location) 0.3cm from 2-11 o'clock 03/09/23 1033   Care Cleansed with:;Wound cleanser;Debrided 03/09/23 1033   Dressing Applied;Other (comment) 03/09/23 1033   Dressing Change Due 03/10/23 03/09/23 1033   Debridement     Date/Time: 3/9/2023 9:53 AM  Performed by: Ron Chairez MD  Authorized by: Ron Chairez MD      Time out: Immediately prior to procedure a "time out" was called to verify the correct patient, procedure, equipment, support staff and site/side marked as required.     Consent Done?:  Yes (Verbal)  Local anesthesia used?: No       Wound Details:    Location:  Right foot    Location:  Right 1st Toe    Type of Debridement:  Non-excisional       Length (cm):  0.3       Area (sq cm):  0.09       Width (cm):  0.3       Percent Debrided (%):  80       Depth (cm):  0.2       Total Area Debrided (sq cm):  0.07    Depth of debridement:  Epidermis/Dermis    Tissue debrided:  Epidermis    Devitalized tissue debrided:  Callus and Biofilm    Instruments:  Curette (dermal)     Additional wounds:  1     2nd Wound Details:     Location:  Left foot    Location:  Left 1st Toe    Location:  Left 1st Toe    Type of Debridement:  Non-excisional       Length (cm):  0.7       Area (sq cm):  0.14       Width (cm):  0.2       Percent Debrided (%):  80       Depth (cm):  0.3       Total Area Debrided (sq cm):  0.11    Depth of debridement:  Epidermis/Dermis    Tissue debrided:  Epidermis    Devitalized tissue debrided:  Callus, Biofilm and Exudate    Instruments:  Curette (dermal)     Bleeding:  None  Patient tolerance:  Patient tolerated the procedure well with no immediate complications      Assessment:         ICD-10-CM ICD-9-CM   1. Open wound of toe of left foot  S91.109A 893.0   2. Open wound of right foot excluding one or more toes, sequela  S91.301S 906.1         Procedures:   Excisional debridement performed:  [] Yes [x] No   Selective debridement " performed:  [x] Yes [] No   Mechanical debridement performed:  [] Yes [x] No   Silver nitrate applied :    [] Yes [x] No   Labs ordered this visit:   [] Yes [x] No   Imaging ordered this visit:   [] Yes [x] No   Tissue pathology and/or culture taken:  [] Yes [x] No     Procedures:  HOME HEALTH AGENCY: none  TIMES PER WEEK/DAYS:  ORDERS:  Left plantar first toe wound: Cleanse with wound cleanser, apply silver alginate, and bandage to be changed daily     Patient Orders:  Patient is leaving for work in [place] for 2 months, will return if needed after he returns from work                   Follow up if symptoms worsen or fail to improve.

## 2023-03-09 NOTE — PROCEDURES
"Debridement    Date/Time: 3/9/2023 9:53 AM  Performed by: Ron Chairez MD  Authorized by: Ron Chairez MD     Time out: Immediately prior to procedure a "time out" was called to verify the correct patient, procedure, equipment, support staff and site/side marked as required.    Consent Done?:  Yes (Verbal)  Local anesthesia used?: No      Wound Details:    Location:  Right foot    Location:  Right 1st Toe    Type of Debridement:  Non-excisional       Length (cm):  0.3       Area (sq cm):  0.09       Width (cm):  0.3       Percent Debrided (%):  80       Depth (cm):  0.2       Total Area Debrided (sq cm):  0.07    Depth of debridement:  Epidermis/Dermis    Tissue debrided:  Epidermis    Devitalized tissue debrided:  Callus and Biofilm    Instruments:  Curette (dermal)    Additional wounds:  1    2nd Wound Details:     Location:  Left foot    Location:  Left 1st Toe    Location:  Left 1st Toe    Type of Debridement:  Non-excisional       Length (cm):  0.7       Area (sq cm):  0.14       Width (cm):  0.2       Percent Debrided (%):  80       Depth (cm):  0.3       Total Area Debrided (sq cm):  0.11    Depth of debridement:  Epidermis/Dermis    Tissue debrided:  Epidermis    Devitalized tissue debrided:  Callus, Biofilm and Exudate    Instruments:  Curette (dermal)    Bleeding:  None  Patient tolerance:  Patient tolerated the procedure well with no immediate complications  "
Yes

## 2023-06-22 ENCOUNTER — LAB VISIT (OUTPATIENT)
Dept: LAB | Facility: HOSPITAL | Age: 68
End: 2023-06-22
Attending: INTERNAL MEDICINE
Payer: MEDICARE

## 2023-06-22 DIAGNOSIS — R60.9 EDEMA, UNSPECIFIED TYPE: Primary | ICD-10-CM

## 2023-06-22 DIAGNOSIS — R06.09 DYSPNEA ON EXERTION: ICD-10-CM

## 2023-06-22 DIAGNOSIS — Z79.899 ENCOUNTER FOR LONG-TERM (CURRENT) USE OF OTHER MEDICATIONS: ICD-10-CM

## 2023-06-22 DIAGNOSIS — I48.91 ATRIAL FIBRILLATION: ICD-10-CM

## 2023-06-22 LAB
ALBUMIN SERPL-MCNC: 3.7 G/DL (ref 3.4–4.8)
ALBUMIN/GLOB SERPL: 1.2 RATIO (ref 1.1–2)
ALP SERPL-CCNC: 22 UNIT/L (ref 40–150)
ALT SERPL-CCNC: 27 UNIT/L (ref 0–55)
AST SERPL-CCNC: 25 UNIT/L (ref 5–34)
BILIRUBIN DIRECT+TOT PNL SERPL-MCNC: 0.8 MG/DL
BUN SERPL-MCNC: 15 MG/DL (ref 8.4–25.7)
CALCIUM SERPL-MCNC: 9.7 MG/DL (ref 8.8–10)
CHLORIDE SERPL-SCNC: 100 MMOL/L (ref 98–107)
CHOLEST SERPL-MCNC: 128 MG/DL
CHOLEST/HDLC SERPL: 3 {RATIO} (ref 0–5)
CO2 SERPL-SCNC: 33 MMOL/L (ref 23–31)
CREAT SERPL-MCNC: 1.4 MG/DL (ref 0.73–1.18)
ERYTHROCYTE [DISTWIDTH] IN BLOOD BY AUTOMATED COUNT: 15.2 % (ref 11.5–17)
GFR SERPLBLD CREATININE-BSD FMLA CKD-EPI: 55 MLS/MIN/1.73/M2
GLOBULIN SER-MCNC: 3 GM/DL (ref 2.4–3.5)
GLUCOSE SERPL-MCNC: 100 MG/DL (ref 82–115)
HCT VFR BLD AUTO: 48.1 % (ref 42–52)
HDLC SERPL-MCNC: 51 MG/DL (ref 35–60)
HGB BLD-MCNC: 15.5 G/DL (ref 14–18)
LDLC SERPL CALC-MCNC: 60 MG/DL (ref 50–140)
MCH RBC QN AUTO: 28.1 PG (ref 27–31)
MCHC RBC AUTO-ENTMCNC: 32.2 G/DL (ref 33–36)
MCV RBC AUTO: 87.3 FL (ref 80–94)
PLATELET # BLD AUTO: 158 X10(3)/MCL (ref 130–400)
PMV BLD AUTO: 12.5 FL (ref 7.4–10.4)
POTASSIUM SERPL-SCNC: 3.8 MMOL/L (ref 3.5–5.1)
PROT SERPL-MCNC: 6.7 GM/DL (ref 5.8–7.6)
RBC # BLD AUTO: 5.51 X10(6)/MCL (ref 4.7–6.1)
SODIUM SERPL-SCNC: 140 MMOL/L (ref 136–145)
TRIGL SERPL-MCNC: 87 MG/DL (ref 34–140)
TSH SERPL-ACNC: 2.03 UIU/ML (ref 0.35–4.94)
VLDLC SERPL CALC-MCNC: 17 MG/DL
WBC # SPEC AUTO: 7.6 X10(3)/MCL (ref 4.5–11.5)

## 2023-06-22 PROCEDURE — 80053 COMPREHEN METABOLIC PANEL: CPT

## 2023-06-22 PROCEDURE — 36415 COLL VENOUS BLD VENIPUNCTURE: CPT

## 2023-06-22 PROCEDURE — 80061 LIPID PANEL: CPT

## 2023-06-22 PROCEDURE — 85027 COMPLETE CBC AUTOMATED: CPT

## 2023-06-22 PROCEDURE — 84443 ASSAY THYROID STIM HORMONE: CPT

## 2024-03-01 DIAGNOSIS — Z87.891 PERSONAL HISTORY OF TOBACCO USE, PRESENTING HAZARDS TO HEALTH: Primary | ICD-10-CM

## 2024-03-04 ENCOUNTER — HOSPITAL ENCOUNTER (OUTPATIENT)
Dept: RADIOLOGY | Facility: HOSPITAL | Age: 69
Discharge: HOME | End: 2024-03-04
Attending: FAMILY MEDICINE
Payer: MEDICARE

## 2024-03-04 DIAGNOSIS — Z87.891 PERSONAL HISTORY OF TOBACCO USE, PRESENTING HAZARDS TO HEALTH: ICD-10-CM

## 2024-03-04 PROCEDURE — 71271 CT THORAX LUNG CANCER SCR C-: CPT | Mod: TC

## 2024-03-13 ENCOUNTER — LAB VISIT (OUTPATIENT)
Dept: LAB | Facility: HOSPITAL | Age: 69
End: 2024-03-13
Attending: FAMILY MEDICINE
Payer: MEDICARE

## 2024-03-13 DIAGNOSIS — I11.9 MALIGNANT HYPERTENSIVE HEART DISEASE WITHOUT HEART FAILURE: Primary | ICD-10-CM

## 2024-03-13 DIAGNOSIS — R94.6 ABNORMAL RESULTS OF THYROID FUNCTION STUDIES: ICD-10-CM

## 2024-03-13 DIAGNOSIS — R79.1 ABNORMAL COAGULATION PROFILE: ICD-10-CM

## 2024-03-13 DIAGNOSIS — Z01.818 PRE-OP TESTING: ICD-10-CM

## 2024-03-13 DIAGNOSIS — Z12.5 SPECIAL SCREENING FOR MALIGNANT NEOPLASM OF PROSTATE: ICD-10-CM

## 2024-03-13 LAB
ALBUMIN SERPL-MCNC: 3.6 G/DL (ref 3.4–4.8)
ALBUMIN/GLOB SERPL: 1 RATIO (ref 1.1–2)
ALP SERPL-CCNC: 32 UNIT/L (ref 40–150)
ALT SERPL-CCNC: 29 UNIT/L (ref 0–55)
AST SERPL-CCNC: 30 UNIT/L (ref 5–34)
BASOPHILS # BLD AUTO: 0.02 X10(3)/MCL
BASOPHILS NFR BLD AUTO: 0.3 %
BILIRUB SERPL-MCNC: 0.8 MG/DL
BUN SERPL-MCNC: 19 MG/DL (ref 8.4–25.7)
CALCIUM SERPL-MCNC: 9.2 MG/DL (ref 8.8–10)
CHLORIDE SERPL-SCNC: 102 MMOL/L (ref 98–107)
CHOLEST SERPL-MCNC: 152 MG/DL
CHOLEST/HDLC SERPL: 3 {RATIO} (ref 0–5)
CO2 SERPL-SCNC: 30 MMOL/L (ref 23–31)
CREAT SERPL-MCNC: 1.53 MG/DL (ref 0.73–1.18)
EOSINOPHIL # BLD AUTO: 0.12 X10(3)/MCL (ref 0–0.9)
EOSINOPHIL NFR BLD AUTO: 1.7 %
ERYTHROCYTE [DISTWIDTH] IN BLOOD BY AUTOMATED COUNT: 18.3 % (ref 11.5–17)
GFR SERPLBLD CREATININE-BSD FMLA CKD-EPI: 49 MLS/MIN/1.73/M2
GLOBULIN SER-MCNC: 3.5 GM/DL (ref 2.4–3.5)
GLUCOSE SERPL-MCNC: 92 MG/DL (ref 82–115)
HCT VFR BLD AUTO: 50.8 % (ref 42–52)
HDLC SERPL-MCNC: 55 MG/DL (ref 35–60)
HGB BLD-MCNC: 16.3 G/DL (ref 14–18)
IMM GRANULOCYTES # BLD AUTO: 0.03 X10(3)/MCL (ref 0–0.04)
IMM GRANULOCYTES NFR BLD AUTO: 0.4 %
INR PPP: 1.1
LDLC SERPL CALC-MCNC: 73 MG/DL (ref 50–140)
LYMPHOCYTES # BLD AUTO: 2.07 X10(3)/MCL (ref 0.6–4.6)
LYMPHOCYTES NFR BLD AUTO: 29.5 %
MCH RBC QN AUTO: 27 PG (ref 27–31)
MCHC RBC AUTO-ENTMCNC: 32.1 G/DL (ref 33–36)
MCV RBC AUTO: 84.2 FL (ref 80–94)
MONOCYTES # BLD AUTO: 0.77 X10(3)/MCL (ref 0.1–1.3)
MONOCYTES NFR BLD AUTO: 11 %
NEUTROPHILS # BLD AUTO: 4.01 X10(3)/MCL (ref 2.1–9.2)
NEUTROPHILS NFR BLD AUTO: 57.1 %
PLATELET # BLD AUTO: 165 X10(3)/MCL (ref 130–400)
PMV BLD AUTO: 11 FL (ref 7.4–10.4)
POTASSIUM SERPL-SCNC: 3.8 MMOL/L (ref 3.5–5.1)
PROT SERPL-MCNC: 7.1 GM/DL (ref 5.8–7.6)
PROTHROMBIN TIME: 14.8 SECONDS (ref 12.5–14.5)
PSA SERPL-MCNC: 2.34 NG/ML
RBC # BLD AUTO: 6.03 X10(6)/MCL (ref 4.7–6.1)
SODIUM SERPL-SCNC: 141 MMOL/L (ref 136–145)
TRIGL SERPL-MCNC: 119 MG/DL (ref 34–140)
TSH SERPL-ACNC: 1 UIU/ML (ref 0.35–4.94)
VLDLC SERPL CALC-MCNC: 24 MG/DL
WBC # SPEC AUTO: 7.02 X10(3)/MCL (ref 4.5–11.5)

## 2024-03-13 PROCEDURE — 84443 ASSAY THYROID STIM HORMONE: CPT

## 2024-03-13 PROCEDURE — 85025 COMPLETE CBC W/AUTO DIFF WBC: CPT

## 2024-03-13 PROCEDURE — 36415 COLL VENOUS BLD VENIPUNCTURE: CPT

## 2024-03-13 PROCEDURE — 85610 PROTHROMBIN TIME: CPT

## 2024-03-13 PROCEDURE — 80061 LIPID PANEL: CPT

## 2024-03-13 PROCEDURE — 80053 COMPREHEN METABOLIC PANEL: CPT

## 2024-03-13 PROCEDURE — 84153 ASSAY OF PSA TOTAL: CPT

## 2024-07-10 ENCOUNTER — HOSPITAL ENCOUNTER (OUTPATIENT)
Dept: WOUND CARE | Facility: HOSPITAL | Age: 69
Discharge: HOME | End: 2024-07-10
Attending: NURSE PRACTITIONER
Payer: MEDICARE

## 2024-07-10 VITALS
SYSTOLIC BLOOD PRESSURE: 135 MMHG | HEIGHT: 70 IN | BODY MASS INDEX: 27.2 KG/M2 | WEIGHT: 190 LBS | RESPIRATION RATE: 18 BRPM | DIASTOLIC BLOOD PRESSURE: 90 MMHG | HEART RATE: 80 BPM

## 2024-07-10 DIAGNOSIS — S91.301A OPEN WOUND OF RIGHT FOOT, INITIAL ENCOUNTER: Primary | ICD-10-CM

## 2024-07-10 PROCEDURE — 97597 DBRDMT OPN WND 1ST 20 CM/<: CPT

## 2024-07-10 PROCEDURE — 27000999 HC MEDICAL RECORD PHOTO DOCUMENTATION

## 2024-07-10 PROCEDURE — 99212 OFFICE O/P EST SF 10 MIN: CPT | Mod: 25

## 2024-07-10 RX ORDER — BISOPROLOL FUMARATE 10 MG/1
20 TABLET, FILM COATED ORAL
COMMUNITY
Start: 2024-05-17

## 2024-07-10 RX ORDER — APIXABAN 5 MG/1
5 TABLET, FILM COATED ORAL 2 TIMES DAILY
COMMUNITY
Start: 2024-03-05

## 2024-07-10 RX ORDER — OMEPRAZOLE 40 MG/1
CAPSULE, DELAYED RELEASE ORAL
COMMUNITY
Start: 2024-05-15

## 2024-07-10 RX ORDER — FAMOTIDINE 40 MG/1
TABLET, FILM COATED ORAL
COMMUNITY
Start: 2024-01-19
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
## 2024-07-10 NOTE — PROCEDURES
"Debridement    Date/Time: 7/10/2024 9:57 AM    Performed by: Nancy Noyola NP  Authorized by: Nancy Noyola NP    Time out: Immediately prior to procedure a "time out" was called to verify the correct patient, procedure, equipment, support staff and site/side marked as required.    Consent Done?:  Yes (Verbal)    Preparation: Patient was prepped and draped with clean technique    Local anesthesia used?: No      Wound Details:    Location:  Right foot    Location:  Right 1st Toe    Type of Debridement:  Non-excisional       Length (cm):  1.5       Area (sq cm):  1.5       Width (cm):  1       Percent Debrided (%):  100       Depth (cm):  0.2       Total Area Debrided (sq cm):  1.5    Depth of debridement:  Epidermis/Dermis    Devitalized tissue debrided:  Biofilm, Callus, Exudate and Fibrin    Instruments:  Curette (Dermal)  Bleeding:  None  Patient tolerance:  Patient tolerated the procedure well with no immediate complications    "

## 2024-07-17 ENCOUNTER — HOSPITAL ENCOUNTER (OUTPATIENT)
Dept: WOUND CARE | Facility: HOSPITAL | Age: 69
Discharge: HOME | End: 2024-07-17
Attending: NURSE PRACTITIONER
Payer: MEDICARE

## 2024-07-17 VITALS
HEIGHT: 70 IN | DIASTOLIC BLOOD PRESSURE: 92 MMHG | HEART RATE: 80 BPM | WEIGHT: 190.06 LBS | RESPIRATION RATE: 18 BRPM | SYSTOLIC BLOOD PRESSURE: 146 MMHG | BODY MASS INDEX: 27.21 KG/M2

## 2024-07-17 DIAGNOSIS — S91.301S: Primary | ICD-10-CM

## 2024-07-17 PROCEDURE — 27000999 HC MEDICAL RECORD PHOTO DOCUMENTATION

## 2024-07-17 PROCEDURE — 99213 OFFICE O/P EST LOW 20 MIN: CPT | Mod: 25

## 2024-07-17 PROCEDURE — 97597 DBRDMT OPN WND 1ST 20 CM/<: CPT

## 2024-07-17 RX ORDER — PRIMIDONE 50 MG/1
TABLET ORAL
COMMUNITY
Start: 2024-07-16

## 2024-07-17 NOTE — PROCEDURES
"Debridement    Date/Time: 7/17/2024 11:37 AM    Performed by: Nancy Noyola NP  Authorized by: Nancy Noyola NP    Time out: Immediately prior to procedure a "time out" was called to verify the correct patient, procedure, equipment, support staff and site/side marked as required.    Consent Done?:  Yes (Verbal)    Preparation: Patient was prepped and draped with clean technique    Local anesthesia used?: No      Wound Details:    Location:  Right foot    Location:  Right 1st Toe    Type of Debridement:  Non-excisional       Length (cm):  1.4       Area (sq cm):  1.26       Width (cm):  0.9       Percent Debrided (%):  100       Depth (cm):  0.2       Total Area Debrided (sq cm):  1.26    Depth of debridement:  Epidermis/Dermis    Devitalized tissue debrided:  Biofilm, Callus, Exudate and Slough    Instruments:  Curette (Dermal)  Bleeding:  None  Patient tolerance:  Patient tolerated the procedure well with no immediate complications    "
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
## 2024-07-22 ENCOUNTER — LAB VISIT (OUTPATIENT)
Dept: LAB | Facility: HOSPITAL | Age: 69
End: 2024-07-22
Attending: INTERNAL MEDICINE
Payer: MEDICARE

## 2024-07-22 DIAGNOSIS — Z79.899 ENCOUNTER FOR LONG-TERM (CURRENT) USE OF OTHER MEDICATIONS: ICD-10-CM

## 2024-07-22 DIAGNOSIS — E78.5 HYPERLIPIDEMIA, UNSPECIFIED HYPERLIPIDEMIA TYPE: Primary | ICD-10-CM

## 2024-07-22 LAB
ALBUMIN SERPL-MCNC: 3.3 G/DL (ref 3.4–4.8)
ALP SERPL-CCNC: 38 UNIT/L (ref 40–150)
ALT SERPL-CCNC: 18 UNIT/L (ref 0–55)
AST SERPL-CCNC: 20 UNIT/L (ref 5–34)
BILIRUB DIRECT SERPL-MCNC: 0.2 MG/DL (ref 0–?)
BILIRUB SERPL-MCNC: 0.5 MG/DL
BILIRUBIN DIRECT+TOT PNL SERPL-MCNC: 0.3 MG/DL (ref 0–0.8)
CHOLEST SERPL-MCNC: 153 MG/DL
CHOLEST/HDLC SERPL: 3 {RATIO} (ref 0–5)
HDLC SERPL-MCNC: 56 MG/DL (ref 35–60)
LDLC SERPL CALC-MCNC: 82 MG/DL (ref 50–140)
PROT SERPL-MCNC: 6.9 GM/DL (ref 5.8–7.6)
TRIGL SERPL-MCNC: 76 MG/DL (ref 34–140)
VLDLC SERPL CALC-MCNC: 15 MG/DL

## 2024-07-22 PROCEDURE — 80061 LIPID PANEL: CPT

## 2024-07-22 PROCEDURE — 36415 COLL VENOUS BLD VENIPUNCTURE: CPT

## 2024-07-22 PROCEDURE — 80076 HEPATIC FUNCTION PANEL: CPT

## 2024-07-24 ENCOUNTER — HOSPITAL ENCOUNTER (OUTPATIENT)
Dept: WOUND CARE | Facility: HOSPITAL | Age: 69
Discharge: HOME | End: 2024-07-24
Attending: NURSE PRACTITIONER
Payer: MEDICARE

## 2024-07-24 ENCOUNTER — HOSPITAL ENCOUNTER (OUTPATIENT)
Dept: RADIOLOGY | Facility: HOSPITAL | Age: 69
Discharge: HOME | End: 2024-07-24
Attending: NURSE PRACTITIONER
Payer: MEDICARE

## 2024-07-24 VITALS
HEIGHT: 70 IN | RESPIRATION RATE: 18 BRPM | SYSTOLIC BLOOD PRESSURE: 173 MMHG | DIASTOLIC BLOOD PRESSURE: 88 MMHG | BODY MASS INDEX: 27.21 KG/M2 | HEART RATE: 58 BPM | WEIGHT: 190.06 LBS

## 2024-07-24 DIAGNOSIS — S91.301S: Primary | ICD-10-CM

## 2024-07-24 DIAGNOSIS — S91.301S: ICD-10-CM

## 2024-07-24 PROCEDURE — 27000999 HC MEDICAL RECORD PHOTO DOCUMENTATION

## 2024-07-24 PROCEDURE — 73620 X-RAY EXAM OF FOOT: CPT | Mod: TC,RT

## 2024-07-24 PROCEDURE — 99212 OFFICE O/P EST SF 10 MIN: CPT

## 2024-07-24 PROCEDURE — 97597 DBRDMT OPN WND 1ST 20 CM/<: CPT

## 2024-07-24 RX ORDER — DOXYCYCLINE HYCLATE 100 MG
100 TABLET ORAL 2 TIMES DAILY
Qty: 14 TABLET | Refills: 0 | Status: SHIPPED | OUTPATIENT
Start: 2024-07-24 | End: 2024-07-31

## 2024-07-24 RX ORDER — FLUTICASONE FUROATE, UMECLIDINIUM BROMIDE AND VILANTEROL TRIFENATATE 100; 62.5; 25 UG/1; UG/1; UG/1
POWDER RESPIRATORY (INHALATION)
COMMUNITY
Start: 2024-07-17
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
## 2024-07-24 NOTE — PROCEDURES
"Debridement    Date/Time: 7/24/2024 10:52 AM    Performed by: Nancy Noyola NP  Authorized by: Nancy Noyola NP    Time out: Immediately prior to procedure a "time out" was called to verify the correct patient, procedure, equipment, support staff and site/side marked as required.    Consent Done?:  Yes (Verbal)    Preparation: Patient was prepped and draped with clean technique    Local anesthesia used?: No      Wound Details:    Location:  Right foot    Location:  Right 1st Toe    Type of Debridement:  Non-excisional       Length (cm):  1.5       Area (sq cm):  1.5       Width (cm):  1       Percent Debrided (%):  100       Depth (cm):  0.3       Total Area Debrided (sq cm):  1.5    Depth of debridement:  Epidermis/Dermis    Devitalized tissue debrided:  Biofilm, Callus, Exudate, Fibrin and Slough    Instruments:  Curette (Dermal)  Bleeding:  None  Patient tolerance:  Patient tolerated the procedure well with no immediate complications    "

## 2024-07-29 ENCOUNTER — HOSPITAL ENCOUNTER (OUTPATIENT)
Dept: RADIOLOGY | Facility: HOSPITAL | Age: 69
Discharge: HOME | End: 2024-07-29
Attending: NURSE PRACTITIONER
Payer: MEDICARE

## 2024-07-29 DIAGNOSIS — L89.892 DECUBITUS ULCER OF FOOT, STAGE 2: ICD-10-CM

## 2024-07-29 PROCEDURE — 93922 UPR/L XTREMITY ART 2 LEVELS: CPT | Mod: TC,52

## 2024-07-29 PROCEDURE — 93926 LOWER EXTREMITY STUDY: CPT | Mod: TC,RT

## 2024-07-29 PROCEDURE — 93970 EXTREMITY STUDY: CPT | Mod: TC

## 2024-07-31 ENCOUNTER — HOSPITAL ENCOUNTER (OUTPATIENT)
Dept: WOUND CARE | Facility: HOSPITAL | Age: 69
Discharge: HOME | End: 2024-07-31
Attending: NURSE PRACTITIONER
Payer: MEDICARE

## 2024-07-31 VITALS
SYSTOLIC BLOOD PRESSURE: 132 MMHG | HEIGHT: 70 IN | RESPIRATION RATE: 18 BRPM | HEART RATE: 59 BPM | WEIGHT: 190.06 LBS | BODY MASS INDEX: 27.21 KG/M2 | DIASTOLIC BLOOD PRESSURE: 84 MMHG

## 2024-07-31 DIAGNOSIS — S91.301S: Primary | ICD-10-CM

## 2024-07-31 PROCEDURE — 99212 OFFICE O/P EST SF 10 MIN: CPT

## 2024-07-31 PROCEDURE — 27000999 HC MEDICAL RECORD PHOTO DOCUMENTATION

## 2024-07-31 PROCEDURE — 97597 DBRDMT OPN WND 1ST 20 CM/<: CPT

## 2024-07-31 RX ORDER — RIVAROXABAN 20 MG/1
20 TABLET, FILM COATED ORAL
COMMUNITY
Start: 2024-07-26

## 2024-07-31 RX ORDER — DOXYCYCLINE HYCLATE 100 MG
100 TABLET ORAL 2 TIMES DAILY
Qty: 14 TABLET | Refills: 0 | Status: SHIPPED | OUTPATIENT
Start: 2024-07-31 | End: 2024-08-07
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
## 2024-08-07 ENCOUNTER — HOSPITAL ENCOUNTER (OUTPATIENT)
Dept: WOUND CARE | Facility: HOSPITAL | Age: 69
Discharge: HOME | End: 2024-08-07
Attending: FAMILY MEDICINE
Payer: MEDICARE

## 2024-08-07 VITALS
BODY MASS INDEX: 27.21 KG/M2 | HEIGHT: 70 IN | RESPIRATION RATE: 18 BRPM | SYSTOLIC BLOOD PRESSURE: 124 MMHG | WEIGHT: 190.06 LBS | DIASTOLIC BLOOD PRESSURE: 81 MMHG | HEART RATE: 51 BPM

## 2024-08-07 DIAGNOSIS — S91.101D OPEN WOUND OF RIGHT GREAT TOE, SUBSEQUENT ENCOUNTER: Primary | ICD-10-CM

## 2024-08-07 PROBLEM — S91.101A OPEN WOUND OF RIGHT GREAT TOE: Status: ACTIVE | Noted: 2024-08-07

## 2024-08-07 PROCEDURE — 27000999 HC MEDICAL RECORD PHOTO DOCUMENTATION

## 2024-08-07 PROCEDURE — 99212 OFFICE O/P EST SF 10 MIN: CPT | Mod: 25

## 2024-08-07 PROCEDURE — 97597 DBRDMT OPN WND 1ST 20 CM/<: CPT

## 2024-08-12 ENCOUNTER — HOSPITAL ENCOUNTER (OUTPATIENT)
Dept: WOUND CARE | Facility: HOSPITAL | Age: 69
Discharge: HOME | End: 2024-08-12
Attending: FAMILY MEDICINE
Payer: MEDICARE

## 2024-08-12 VITALS
HEIGHT: 70 IN | SYSTOLIC BLOOD PRESSURE: 122 MMHG | WEIGHT: 190.06 LBS | BODY MASS INDEX: 27.21 KG/M2 | RESPIRATION RATE: 18 BRPM | DIASTOLIC BLOOD PRESSURE: 84 MMHG | HEART RATE: 77 BPM

## 2024-08-12 DIAGNOSIS — S91.101D OPEN WOUND OF RIGHT GREAT TOE, SUBSEQUENT ENCOUNTER: Primary | ICD-10-CM

## 2024-08-12 PROCEDURE — 27000999 HC MEDICAL RECORD PHOTO DOCUMENTATION

## 2024-08-12 PROCEDURE — 99213 OFFICE O/P EST LOW 20 MIN: CPT

## 2024-08-12 PROCEDURE — 97597 DBRDMT OPN WND 1ST 20 CM/<: CPT

## 2024-09-04 ENCOUNTER — HOSPITAL ENCOUNTER (OUTPATIENT)
Dept: WOUND CARE | Facility: HOSPITAL | Age: 69
Discharge: HOME | End: 2024-09-04
Attending: NURSE PRACTITIONER
Payer: MEDICARE

## 2024-09-04 VITALS
DIASTOLIC BLOOD PRESSURE: 93 MMHG | SYSTOLIC BLOOD PRESSURE: 146 MMHG | HEIGHT: 70 IN | HEART RATE: 72 BPM | BODY MASS INDEX: 27.21 KG/M2 | WEIGHT: 190.06 LBS | RESPIRATION RATE: 18 BRPM

## 2024-09-04 DIAGNOSIS — S91.101D OPEN WOUND OF RIGHT GREAT TOE, SUBSEQUENT ENCOUNTER: ICD-10-CM

## 2024-09-04 DIAGNOSIS — L97.401 DIABETIC ULCER OF HEEL ASSOCIATED WITH DIABETES MELLITUS DUE TO UNDERLYING CONDITION, LIMITED TO BREAKDOWN OF SKIN, UNSPECIFIED LATERALITY: Primary | ICD-10-CM

## 2024-09-04 DIAGNOSIS — E08.621 DIABETIC ULCER OF HEEL ASSOCIATED WITH DIABETES MELLITUS DUE TO UNDERLYING CONDITION, LIMITED TO BREAKDOWN OF SKIN, UNSPECIFIED LATERALITY: Primary | ICD-10-CM

## 2024-09-04 PROCEDURE — 99212 OFFICE O/P EST SF 10 MIN: CPT

## 2024-09-04 PROCEDURE — 27000999 HC MEDICAL RECORD PHOTO DOCUMENTATION

## 2024-09-04 PROCEDURE — 97597 DBRDMT OPN WND 1ST 20 CM/<: CPT
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
## 2024-09-04 NOTE — PROCEDURES
"Debridement    Date/Time: 9/4/2024 1:40 PM    Performed by: Nancy Noyola NP  Authorized by: Nancy Noyola NP    Time out: Immediately prior to procedure a "time out" was called to verify the correct patient, procedure, equipment, support staff and site/side marked as required.    Consent Done?:  Yes (Verbal)    Preparation: Patient was prepped and draped with clean technique    Local anesthesia used?: No      Wound Details:    Location:  Right foot    Location:  Right 1st Toe    Type of Debridement:  Non-excisional       Length (cm):  1.1       Area (sq cm):  0.66       Width (cm):  0.6       Percent Debrided (%):  100       Depth (cm):  0.2       Total Area Debrided (sq cm):  0.66    Depth of debridement:  Epidermis/Dermis    Devitalized tissue debrided:  Biofilm, Callus, Exudate and Fibrin    Instruments:  Curette (Dermal)  Bleeding:  None  Patient tolerance:  Patient tolerated the procedure well with no immediate complications    "

## 2024-09-25 ENCOUNTER — HOSPITAL ENCOUNTER (OUTPATIENT)
Dept: WOUND CARE | Facility: HOSPITAL | Age: 69
Discharge: HOME | End: 2024-09-25
Attending: NURSE PRACTITIONER
Payer: MEDICARE

## 2024-09-25 VITALS
HEIGHT: 70 IN | DIASTOLIC BLOOD PRESSURE: 100 MMHG | BODY MASS INDEX: 27.21 KG/M2 | WEIGHT: 190.06 LBS | RESPIRATION RATE: 18 BRPM | SYSTOLIC BLOOD PRESSURE: 150 MMHG | TEMPERATURE: 99 F | HEART RATE: 96 BPM

## 2024-09-25 DIAGNOSIS — L03.115 CELLULITIS OF RIGHT ANTERIOR LOWER LEG: ICD-10-CM

## 2024-09-25 DIAGNOSIS — S91.101D OPEN WOUND OF RIGHT GREAT TOE, SUBSEQUENT ENCOUNTER: Primary | ICD-10-CM

## 2024-09-25 PROCEDURE — 97597 DBRDMT OPN WND 1ST 20 CM/<: CPT

## 2024-09-25 PROCEDURE — 27000999 HC MEDICAL RECORD PHOTO DOCUMENTATION

## 2024-09-25 PROCEDURE — 99212 OFFICE O/P EST SF 10 MIN: CPT

## 2024-09-25 RX ORDER — CEPHALEXIN 500 MG/1
500 TABLET ORAL 4 TIMES DAILY
Qty: 40 TABLET | Refills: 0 | Status: SHIPPED | OUTPATIENT
Start: 2024-09-25 | End: 2024-10-05
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
## 2024-09-25 NOTE — PROCEDURES
"Debridement    Date/Time: 9/25/2024 11:09 AM    Performed by: Nancy Noyola NP  Authorized by: Nancy Noyola NP    Time out: Immediately prior to procedure a "time out" was called to verify the correct patient, procedure, equipment, support staff and site/side marked as required.    Consent Done?:  Yes (Verbal)    Preparation: Patient was prepped and draped with clean technique    Local anesthesia used?: No      Wound Details:    Location:  Right foot    Location:  Right 1st Toe    Type of Debridement:  Non-excisional       Length (cm):  0.6       Area (sq cm):  0.24       Width (cm):  0.4       Percent Debrided (%):  100       Depth (cm):  0.2       Total Area Debrided (sq cm):  0.24    Depth of debridement:  Epidermis/Dermis    Devitalized tissue debrided:  Biofilm, Callus, Exudate and Fibrin    Instruments:  Curette (Dermal)  Bleeding:  None  Patient tolerance:  Patient tolerated the procedure well with no immediate complications    "

## 2024-12-04 ENCOUNTER — HOSPITAL ENCOUNTER (OUTPATIENT)
Dept: WOUND CARE | Facility: HOSPITAL | Age: 69
Discharge: HOME | End: 2024-12-04
Attending: NURSE PRACTITIONER
Payer: MEDICARE

## 2024-12-04 ENCOUNTER — HOSPITAL ENCOUNTER (OUTPATIENT)
Dept: RADIOLOGY | Facility: HOSPITAL | Age: 69
Discharge: HOME | End: 2024-12-04
Attending: FAMILY MEDICINE
Payer: MEDICARE

## 2024-12-04 VITALS
WEIGHT: 190.06 LBS | RESPIRATION RATE: 18 BRPM | HEIGHT: 70 IN | HEART RATE: 72 BPM | DIASTOLIC BLOOD PRESSURE: 95 MMHG | SYSTOLIC BLOOD PRESSURE: 149 MMHG | BODY MASS INDEX: 27.21 KG/M2

## 2024-12-04 DIAGNOSIS — L97.401 DIABETIC ULCER OF HEEL ASSOCIATED WITH DIABETES MELLITUS DUE TO UNDERLYING CONDITION, LIMITED TO BREAKDOWN OF SKIN, UNSPECIFIED LATERALITY: ICD-10-CM

## 2024-12-04 DIAGNOSIS — S91.101D OPEN WOUND OF RIGHT GREAT TOE, SUBSEQUENT ENCOUNTER: Primary | ICD-10-CM

## 2024-12-04 DIAGNOSIS — R05.9 COUGH: ICD-10-CM

## 2024-12-04 DIAGNOSIS — M79.604 RIGHT LEG PAIN: ICD-10-CM

## 2024-12-04 DIAGNOSIS — E08.621 DIABETIC ULCER OF HEEL ASSOCIATED WITH DIABETES MELLITUS DUE TO UNDERLYING CONDITION, LIMITED TO BREAKDOWN OF SKIN, UNSPECIFIED LATERALITY: ICD-10-CM

## 2024-12-04 DIAGNOSIS — L03.115 CELLULITIS OF RIGHT ANTERIOR LOWER LEG: ICD-10-CM

## 2024-12-04 PROCEDURE — 97597 DBRDMT OPN WND 1ST 20 CM/<: CPT

## 2024-12-04 PROCEDURE — 27000999 HC MEDICAL RECORD PHOTO DOCUMENTATION

## 2024-12-04 PROCEDURE — 71046 X-RAY EXAM CHEST 2 VIEWS: CPT | Mod: TC

## 2024-12-04 PROCEDURE — 99213 OFFICE O/P EST LOW 20 MIN: CPT | Mod: 25

## 2024-12-04 PROCEDURE — 93971 EXTREMITY STUDY: CPT | Mod: TC,RT

## 2024-12-04 RX ORDER — ANASTROZOLE 1 MG/1
1 TABLET ORAL
Status: ON HOLD | COMMUNITY
Start: 2024-11-02
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
## 2024-12-05 ENCOUNTER — HOSPITAL ENCOUNTER (INPATIENT)
Facility: HOSPITAL | Age: 69
LOS: 3 days | Discharge: HOME | DRG: 603 | End: 2024-12-09
Attending: FAMILY MEDICINE | Admitting: FAMILY MEDICINE
Payer: MEDICARE

## 2024-12-05 DIAGNOSIS — R07.9 CHEST PAIN: ICD-10-CM

## 2024-12-05 DIAGNOSIS — L03.90 CELLULITIS: ICD-10-CM

## 2024-12-05 DIAGNOSIS — J44.1 COPD EXACERBATION: Primary | ICD-10-CM

## 2024-12-05 LAB
ALBUMIN SERPL-MCNC: 2.9 G/DL (ref 3.4–4.8)
ALBUMIN/GLOB SERPL: 0.8 RATIO (ref 1.1–2)
ALP SERPL-CCNC: 33 UNIT/L (ref 40–150)
ALT SERPL-CCNC: 41 UNIT/L (ref 0–55)
ANION GAP SERPL CALC-SCNC: 9 MEQ/L
AST SERPL-CCNC: 36 UNIT/L (ref 5–34)
BASOPHILS # BLD AUTO: 0.01 X10(3)/MCL
BASOPHILS NFR BLD AUTO: 0.1 %
BILIRUB SERPL-MCNC: 0.6 MG/DL
BUN SERPL-MCNC: 19 MG/DL (ref 8.4–25.7)
CALCIUM SERPL-MCNC: 9.6 MG/DL (ref 8.8–10)
CHLORIDE SERPL-SCNC: 106 MMOL/L (ref 98–107)
CO2 SERPL-SCNC: 25 MMOL/L (ref 23–31)
CREAT SERPL-MCNC: 1.03 MG/DL (ref 0.72–1.25)
CREAT/UREA NIT SERPL: 18
EOSINOPHIL # BLD AUTO: 0.01 X10(3)/MCL (ref 0–0.9)
EOSINOPHIL NFR BLD AUTO: 0.1 %
ERYTHROCYTE [DISTWIDTH] IN BLOOD BY AUTOMATED COUNT: 14.5 % (ref 11.5–17)
GFR SERPLBLD CREATININE-BSD FMLA CKD-EPI: >60 ML/MIN/1.73/M2
GLOBULIN SER-MCNC: 3.7 GM/DL (ref 2.4–3.5)
GLUCOSE SERPL-MCNC: 115 MG/DL (ref 82–115)
HCT VFR BLD AUTO: 33.3 % (ref 42–52)
HGB BLD-MCNC: 10.6 G/DL (ref 14–18)
IMM GRANULOCYTES # BLD AUTO: 0.06 X10(3)/MCL (ref 0–0.04)
IMM GRANULOCYTES NFR BLD AUTO: 0.6 %
INR PPP: 2
LYMPHOCYTES # BLD AUTO: 1.52 X10(3)/MCL (ref 0.6–4.6)
LYMPHOCYTES NFR BLD AUTO: 14.1 %
MCH RBC QN AUTO: 26.4 PG (ref 27–31)
MCHC RBC AUTO-ENTMCNC: 31.8 G/DL (ref 33–36)
MCV RBC AUTO: 82.8 FL (ref 80–94)
MONOCYTES # BLD AUTO: 1.06 X10(3)/MCL (ref 0.1–1.3)
MONOCYTES NFR BLD AUTO: 9.8 %
NEUTROPHILS # BLD AUTO: 8.13 X10(3)/MCL (ref 2.1–9.2)
NEUTROPHILS NFR BLD AUTO: 75.3 %
PLATELET # BLD AUTO: 224 X10(3)/MCL (ref 130–400)
PMV BLD AUTO: 11.4 FL (ref 7.4–10.4)
POTASSIUM SERPL-SCNC: 3.9 MMOL/L (ref 3.5–5.1)
PROT SERPL-MCNC: 6.6 GM/DL (ref 5.8–7.6)
PROTHROMBIN TIME: 23.7 SECONDS (ref 12.5–14.5)
RBC # BLD AUTO: 4.02 X10(6)/MCL (ref 4.7–6.1)
SODIUM SERPL-SCNC: 140 MMOL/L (ref 136–145)
TROPONIN I SERPL-MCNC: <0.01 NG/ML (ref 0–0.04)
WBC # BLD AUTO: 10.79 X10(3)/MCL (ref 4.5–11.5)

## 2024-12-05 PROCEDURE — 85025 COMPLETE CBC W/AUTO DIFF WBC: CPT | Performed by: FAMILY MEDICINE

## 2024-12-05 PROCEDURE — 25000242 PHARM REV CODE 250 ALT 637 W/ HCPCS: Performed by: FAMILY MEDICINE

## 2024-12-05 PROCEDURE — G0379 DIRECT REFER HOSPITAL OBSERV: HCPCS

## 2024-12-05 PROCEDURE — 84484 ASSAY OF TROPONIN QUANT: CPT | Performed by: FAMILY MEDICINE

## 2024-12-05 PROCEDURE — 80053 COMPREHEN METABOLIC PANEL: CPT | Performed by: FAMILY MEDICINE

## 2024-12-05 PROCEDURE — 63600175 PHARM REV CODE 636 W HCPCS: Mod: JZ,JG | Performed by: FAMILY MEDICINE

## 2024-12-05 PROCEDURE — 94761 N-INVAS EAR/PLS OXIMETRY MLT: CPT

## 2024-12-05 PROCEDURE — 36415 COLL VENOUS BLD VENIPUNCTURE: CPT | Performed by: FAMILY MEDICINE

## 2024-12-05 PROCEDURE — 94640 AIRWAY INHALATION TREATMENT: CPT

## 2024-12-05 PROCEDURE — 85610 PROTHROMBIN TIME: CPT | Performed by: FAMILY MEDICINE

## 2024-12-05 PROCEDURE — G0378 HOSPITAL OBSERVATION PER HR: HCPCS

## 2024-12-05 RX ORDER — BUDESONIDE 0.5 MG/2ML
0.5 INHALANT ORAL EVERY 12 HOURS
Status: DISCONTINUED | OUTPATIENT
Start: 2024-12-05 | End: 2024-12-09 | Stop reason: HOSPADM

## 2024-12-05 RX ORDER — SIMETHICONE 80 MG
1 TABLET,CHEWABLE ORAL 4 TIMES DAILY PRN
Status: DISCONTINUED | OUTPATIENT
Start: 2024-12-05 | End: 2024-12-09 | Stop reason: HOSPADM

## 2024-12-05 RX ORDER — IBUPROFEN 200 MG
16 TABLET ORAL
Status: DISCONTINUED | OUTPATIENT
Start: 2024-12-05 | End: 2024-12-09 | Stop reason: HOSPADM

## 2024-12-05 RX ORDER — ACETAMINOPHEN 325 MG/1
650 TABLET ORAL EVERY 4 HOURS PRN
Status: DISCONTINUED | OUTPATIENT
Start: 2024-12-05 | End: 2024-12-09 | Stop reason: HOSPADM

## 2024-12-05 RX ORDER — GLUCAGON 1 MG
1 KIT INJECTION
Status: DISCONTINUED | OUTPATIENT
Start: 2024-12-05 | End: 2024-12-09 | Stop reason: HOSPADM

## 2024-12-05 RX ORDER — SODIUM CHLORIDE 0.9 % (FLUSH) 0.9 %
10 SYRINGE (ML) INJECTION EVERY 12 HOURS PRN
Status: DISCONTINUED | OUTPATIENT
Start: 2024-12-05 | End: 2024-12-09 | Stop reason: HOSPADM

## 2024-12-05 RX ORDER — SODIUM CHLORIDE, SODIUM LACTATE, POTASSIUM CHLORIDE, CALCIUM CHLORIDE 600; 310; 30; 20 MG/100ML; MG/100ML; MG/100ML; MG/100ML
INJECTION, SOLUTION INTRAVENOUS CONTINUOUS
Status: DISCONTINUED | OUTPATIENT
Start: 2024-12-05 | End: 2024-12-09 | Stop reason: HOSPADM

## 2024-12-05 RX ORDER — NALOXONE HCL 0.4 MG/ML
0.02 VIAL (ML) INJECTION
Status: DISCONTINUED | OUTPATIENT
Start: 2024-12-05 | End: 2024-12-09 | Stop reason: HOSPADM

## 2024-12-05 RX ORDER — MULTIVITAMIN WITH IRON
2 TABLET ORAL DAILY
COMMUNITY

## 2024-12-05 RX ORDER — BISACODYL 10 MG/1
10 SUPPOSITORY RECTAL DAILY PRN
Status: DISCONTINUED | OUTPATIENT
Start: 2024-12-05 | End: 2024-12-09 | Stop reason: HOSPADM

## 2024-12-05 RX ORDER — IPRATROPIUM BROMIDE AND ALBUTEROL SULFATE 2.5; .5 MG/3ML; MG/3ML
3 SOLUTION RESPIRATORY (INHALATION) EVERY 6 HOURS
Status: DISCONTINUED | OUTPATIENT
Start: 2024-12-05 | End: 2024-12-09 | Stop reason: HOSPADM

## 2024-12-05 RX ORDER — CLINDAMYCIN IN PERCENT DEXTROSE 6 MG/ML
300 INJECTION, SOLUTION INTRAVENOUS
Status: DISCONTINUED | OUTPATIENT
Start: 2024-12-05 | End: 2024-12-07

## 2024-12-05 RX ORDER — HYDROCODONE BITARTRATE AND ACETAMINOPHEN 5; 325 MG/1; MG/1
1 TABLET ORAL EVERY 6 HOURS PRN
Status: DISCONTINUED | OUTPATIENT
Start: 2024-12-05 | End: 2024-12-09 | Stop reason: HOSPADM

## 2024-12-05 RX ORDER — ONDANSETRON HYDROCHLORIDE 2 MG/ML
4 INJECTION, SOLUTION INTRAVENOUS EVERY 8 HOURS PRN
Status: DISCONTINUED | OUTPATIENT
Start: 2024-12-05 | End: 2024-12-09 | Stop reason: HOSPADM

## 2024-12-05 RX ORDER — IBUPROFEN 200 MG
24 TABLET ORAL
Status: DISCONTINUED | OUTPATIENT
Start: 2024-12-05 | End: 2024-12-09 | Stop reason: HOSPADM

## 2024-12-05 RX ADMIN — SODIUM CHLORIDE, POTASSIUM CHLORIDE, SODIUM LACTATE AND CALCIUM CHLORIDE: 600; 310; 30; 20 INJECTION, SOLUTION INTRAVENOUS at 08:12

## 2024-12-05 RX ADMIN — IPRATROPIUM BROMIDE AND ALBUTEROL SULFATE 3 ML: 2.5; .5 SOLUTION RESPIRATORY (INHALATION) at 08:12

## 2024-12-05 RX ADMIN — METHYLPREDNISOLONE SODIUM SUCCINATE 80 MG: 40 INJECTION, POWDER, FOR SOLUTION INTRAMUSCULAR; INTRAVENOUS at 09:12

## 2024-12-05 RX ADMIN — CLINDAMYCIN PHOSPHATE 300 MG: 300 INJECTION, SOLUTION INTRAVENOUS at 08:12

## 2024-12-05 RX ADMIN — BUDESONIDE INHALATION 0.5 MG: 0.5 SUSPENSION RESPIRATORY (INHALATION) at 08:12

## 2024-12-06 PROBLEM — J44.1 COPD EXACERBATION: Status: ACTIVE | Noted: 2024-12-06

## 2024-12-06 PROCEDURE — 25000242 PHARM REV CODE 250 ALT 637 W/ HCPCS: Performed by: FAMILY MEDICINE

## 2024-12-06 PROCEDURE — 99900035 HC TECH TIME PER 15 MIN (STAT)

## 2024-12-06 PROCEDURE — 63600175 PHARM REV CODE 636 W HCPCS: Performed by: FAMILY MEDICINE

## 2024-12-06 PROCEDURE — 94761 N-INVAS EAR/PLS OXIMETRY MLT: CPT

## 2024-12-06 PROCEDURE — 11000001 HC ACUTE MED/SURG PRIVATE ROOM

## 2024-12-06 PROCEDURE — 25000003 PHARM REV CODE 250: Performed by: FAMILY MEDICINE

## 2024-12-06 PROCEDURE — 94640 AIRWAY INHALATION TREATMENT: CPT | Mod: XB

## 2024-12-06 PROCEDURE — 87641 MR-STAPH DNA AMP PROBE: CPT | Performed by: FAMILY MEDICINE

## 2024-12-06 RX ORDER — LISINOPRIL 20 MG/1
40 TABLET ORAL DAILY
Status: DISCONTINUED | OUTPATIENT
Start: 2024-12-06 | End: 2024-12-09 | Stop reason: HOSPADM

## 2024-12-06 RX ORDER — HYDROCODONE BITARTRATE AND ACETAMINOPHEN 10; 325 MG/1; MG/1
1 TABLET ORAL NIGHTLY
Status: DISCONTINUED | OUTPATIENT
Start: 2024-12-07 | End: 2024-12-09 | Stop reason: HOSPADM

## 2024-12-06 RX ORDER — FAMOTIDINE 20 MG/1
20 TABLET, FILM COATED ORAL 2 TIMES DAILY
Status: DISCONTINUED | OUTPATIENT
Start: 2024-12-06 | End: 2024-12-09 | Stop reason: HOSPADM

## 2024-12-06 RX ORDER — PANTOPRAZOLE SODIUM 40 MG/10ML
40 INJECTION, POWDER, LYOPHILIZED, FOR SOLUTION INTRAVENOUS 2 TIMES DAILY
Status: CANCELLED | OUTPATIENT
Start: 2024-12-06

## 2024-12-06 RX ORDER — ANASTROZOLE 1 MG/1
1 TABLET ORAL
Status: DISCONTINUED | OUTPATIENT
Start: 2024-12-06 | End: 2024-12-06

## 2024-12-06 RX ORDER — TIZANIDINE 2 MG/1
8 TABLET ORAL NIGHTLY
Status: DISCONTINUED | OUTPATIENT
Start: 2024-12-06 | End: 2024-12-09 | Stop reason: HOSPADM

## 2024-12-06 RX ORDER — POTASSIUM CHLORIDE 20 MEQ/1
20 TABLET, EXTENDED RELEASE ORAL DAILY
Status: DISCONTINUED | OUTPATIENT
Start: 2024-12-06 | End: 2024-12-09 | Stop reason: HOSPADM

## 2024-12-06 RX ORDER — PANTOPRAZOLE SODIUM 40 MG/1
40 TABLET, DELAYED RELEASE ORAL DAILY
Status: DISCONTINUED | OUTPATIENT
Start: 2024-12-06 | End: 2024-12-09 | Stop reason: HOSPADM

## 2024-12-06 RX ORDER — GABAPENTIN 300 MG/1
300 CAPSULE ORAL ONCE
Status: COMPLETED | OUTPATIENT
Start: 2024-12-06 | End: 2024-12-06

## 2024-12-06 RX ORDER — ATORVASTATIN CALCIUM 20 MG/1
20 TABLET, FILM COATED ORAL DAILY
Status: DISCONTINUED | OUTPATIENT
Start: 2024-12-06 | End: 2024-12-09 | Stop reason: HOSPADM

## 2024-12-06 RX ORDER — TRAZODONE HYDROCHLORIDE 50 MG/1
150 TABLET ORAL NIGHTLY
Status: DISCONTINUED | OUTPATIENT
Start: 2024-12-06 | End: 2024-12-09 | Stop reason: HOSPADM

## 2024-12-06 RX ORDER — HYDROCODONE BITARTRATE AND ACETAMINOPHEN 10; 325 MG/1; MG/1
1 TABLET ORAL ONCE
Status: COMPLETED | OUTPATIENT
Start: 2024-12-06 | End: 2024-12-06

## 2024-12-06 RX ORDER — GABAPENTIN 300 MG/1
600 CAPSULE ORAL 3 TIMES DAILY
Status: DISCONTINUED | OUTPATIENT
Start: 2024-12-06 | End: 2024-12-09 | Stop reason: HOSPADM

## 2024-12-06 RX ORDER — FUROSEMIDE 20 MG/1
20 TABLET ORAL DAILY
Status: DISCONTINUED | OUTPATIENT
Start: 2024-12-06 | End: 2024-12-09 | Stop reason: HOSPADM

## 2024-12-06 RX ORDER — GLUCOSAM/CHONDRO/HERB 149/HYAL 750-100 MG
2 TABLET ORAL DAILY
Status: DISCONTINUED | OUTPATIENT
Start: 2024-12-06 | End: 2024-12-09 | Stop reason: HOSPADM

## 2024-12-06 RX ORDER — FENOFIBRATE 48 MG/1
48 TABLET, FILM COATED ORAL DAILY
Status: DISCONTINUED | OUTPATIENT
Start: 2024-12-06 | End: 2024-12-09 | Stop reason: HOSPADM

## 2024-12-06 RX ORDER — AMLODIPINE BESYLATE 10 MG/1
10 TABLET ORAL DAILY
Status: DISCONTINUED | OUTPATIENT
Start: 2024-12-06 | End: 2024-12-09 | Stop reason: HOSPADM

## 2024-12-06 RX ORDER — TRAZODONE HYDROCHLORIDE 50 MG/1
100 TABLET ORAL NIGHTLY
Status: DISCONTINUED | OUTPATIENT
Start: 2024-12-06 | End: 2024-12-06

## 2024-12-06 RX ORDER — TIZANIDINE 2 MG/1
4 TABLET ORAL EVERY 8 HOURS PRN
Status: DISCONTINUED | OUTPATIENT
Start: 2024-12-06 | End: 2024-12-09 | Stop reason: HOSPADM

## 2024-12-06 RX ORDER — METOPROLOL SUCCINATE 50 MG/1
100 TABLET, EXTENDED RELEASE ORAL DAILY
Status: DISCONTINUED | OUTPATIENT
Start: 2024-12-06 | End: 2024-12-07

## 2024-12-06 RX ADMIN — ATORVASTATIN CALCIUM 20 MG: 20 TABLET, FILM COATED ORAL at 09:12

## 2024-12-06 RX ADMIN — METHYLPREDNISOLONE SODIUM SUCCINATE 80 MG: 40 INJECTION, POWDER, FOR SOLUTION INTRAMUSCULAR; INTRAVENOUS at 05:12

## 2024-12-06 RX ADMIN — HYDROCODONE BITARTRATE AND ACETAMINOPHEN 1 TABLET: 5; 325 TABLET ORAL at 03:12

## 2024-12-06 RX ADMIN — HYDROCODONE BITARTRATE AND ACETAMINOPHEN 1 TABLET: 5; 325 TABLET ORAL at 09:12

## 2024-12-06 RX ADMIN — IPRATROPIUM BROMIDE AND ALBUTEROL SULFATE 3 ML: 2.5; .5 SOLUTION RESPIRATORY (INHALATION) at 01:12

## 2024-12-06 RX ADMIN — PANTOPRAZOLE SODIUM 40 MG: 40 TABLET, DELAYED RELEASE ORAL at 09:12

## 2024-12-06 RX ADMIN — TIZANIDINE 8 MG: 2 TABLET ORAL at 09:12

## 2024-12-06 RX ADMIN — IPRATROPIUM BROMIDE AND ALBUTEROL SULFATE 3 ML: 2.5; .5 SOLUTION RESPIRATORY (INHALATION) at 08:12

## 2024-12-06 RX ADMIN — GABAPENTIN 300 MG: 300 CAPSULE ORAL at 06:12

## 2024-12-06 RX ADMIN — SODIUM CHLORIDE, POTASSIUM CHLORIDE, SODIUM LACTATE AND CALCIUM CHLORIDE: 600; 310; 30; 20 INJECTION, SOLUTION INTRAVENOUS at 09:12

## 2024-12-06 RX ADMIN — BUDESONIDE INHALATION 0.5 MG: 0.5 SUSPENSION RESPIRATORY (INHALATION) at 08:12

## 2024-12-06 RX ADMIN — POTASSIUM CHLORIDE 20 MEQ: 1500 TABLET, EXTENDED RELEASE ORAL at 09:12

## 2024-12-06 RX ADMIN — HYDROCODONE BITARTRATE AND ACETAMINOPHEN 1 TABLET: 10; 325 TABLET ORAL at 06:12

## 2024-12-06 RX ADMIN — FAMOTIDINE 20 MG: 20 TABLET, FILM COATED ORAL at 09:12

## 2024-12-06 RX ADMIN — CLINDAMYCIN PHOSPHATE 300 MG: 300 INJECTION, SOLUTION INTRAVENOUS at 03:12

## 2024-12-06 RX ADMIN — METHYLPREDNISOLONE SODIUM SUCCINATE 80 MG: 40 INJECTION, POWDER, FOR SOLUTION INTRAMUSCULAR; INTRAVENOUS at 03:12

## 2024-12-06 RX ADMIN — CLINDAMYCIN PHOSPHATE 300 MG: 300 INJECTION, SOLUTION INTRAVENOUS at 10:12

## 2024-12-06 RX ADMIN — TRAZODONE HYDROCHLORIDE 150 MG: 50 TABLET ORAL at 09:12

## 2024-12-06 RX ADMIN — BUDESONIDE INHALATION 0.5 MG: 0.5 SUSPENSION RESPIRATORY (INHALATION) at 07:12

## 2024-12-06 RX ADMIN — FUROSEMIDE 20 MG: 20 TABLET ORAL at 09:12

## 2024-12-06 RX ADMIN — CLINDAMYCIN PHOSPHATE 300 MG: 300 INJECTION, SOLUTION INTRAVENOUS at 09:12

## 2024-12-06 RX ADMIN — GABAPENTIN 600 MG: 300 CAPSULE ORAL at 09:12

## 2024-12-06 RX ADMIN — FENOFIBRATE 48 MG: 48 TABLET, FILM COATED ORAL at 09:12

## 2024-12-06 RX ADMIN — OMEGA-3 FATTY ACIDS CAP 1000 MG 2 CAPSULE: 1000 CAP at 09:12

## 2024-12-06 RX ADMIN — RIVAROXABAN 20 MG: 10 TABLET, FILM COATED ORAL at 03:12

## 2024-12-06 RX ADMIN — AMLODIPINE BESYLATE 10 MG: 10 TABLET ORAL at 11:12

## 2024-12-06 RX ADMIN — IPRATROPIUM BROMIDE AND ALBUTEROL SULFATE 3 ML: 2.5; .5 SOLUTION RESPIRATORY (INHALATION) at 07:12

## 2024-12-06 RX ADMIN — METOPROLOL SUCCINATE 100 MG: 50 TABLET, EXTENDED RELEASE ORAL at 09:12

## 2024-12-06 RX ADMIN — LISINOPRIL 40 MG: 20 TABLET ORAL at 11:12

## 2024-12-06 RX ADMIN — VANCOMYCIN HYDROCHLORIDE 1750 MG: 1 INJECTION, POWDER, LYOPHILIZED, FOR SOLUTION INTRAVENOUS at 12:12

## 2024-12-06 NOTE — H&P
HISTORY & PHYSICAL  Hospital Medicine    Team: Networked reference to record PCT     PRESENTING HISTORY     Chief Complaint/Reason for Admission:  right leg  w swelling     History of Present Illness:  Mr. Paulo Dickerson is a 69 y.o.  history of neuropathy.  History of multiple nonhealing wounds.  Presented to my office today prior to admission with red leg.  He had had of a visit to walk-in clinic and they gave him doxycycline.  The redness is slightly improved.  The redness and swelling in his leg was notable.  I did do a venous ultrasound which ruled out a blood clot.  Calf muscle was 44 cm on the right and 41 on the left.  I offered admission the day of the visit and the patient declined.  He called back the next day wanting to be admitted for direct admission IV antibiotics and also patient has a history of COPD and was having increased wheezing he was also wanting to have steroids for his wheezing and COPD    Review of Systems:  Review of Systems   Constitutional:  Positive for fever and malaise/fatigue. Negative for chills and weight loss.   HENT:  Positive for congestion. Negative for ear discharge, ear pain, hearing loss, nosebleeds, sinus pain, sore throat and tinnitus.    Eyes:  Negative for blurred vision and double vision.   Respiratory:  Positive for cough and wheezing. Negative for hemoptysis, sputum production and stridor.    Cardiovascular:  Negative for chest pain, palpitations, orthopnea and claudication.   Gastrointestinal:  Negative for abdominal pain, blood in stool, constipation, diarrhea, heartburn, nausea and vomiting.   Genitourinary:  Negative for dysuria and urgency.   Musculoskeletal:  Negative for back pain and myalgias.   Skin:  Negative for itching and rash.   Neurological:  Positive for headaches. Negative for dizziness, tingling, tremors, sensory change, speech change, focal weakness, seizures and weakness.   Endo/Heme/Allergies:  Negative for polydipsia.   Psychiatric/Behavioral:   Negative for depression, substance abuse and suicidal ideas.        PAST HISTORY:     Past Medical History:   Diagnosis Date    Carpal tunnel syndrome     bilateral    Hyperlipidemia     Hypertension     Insomnia     Mixed hyperlipidemia     Polyneuropathy     Psoriasis     Unspecified osteoarthritis, unspecified site        Past Surgical History:   Procedure Laterality Date    APPENDECTOMY      hyoid suspension N/A     sinus sugery         Family History   Problem Relation Name Age of Onset    Hypertension Mother      Transient ischemic attack Mother      Chronic back pain Father      Colon cancer Maternal Grandfather         Social History     Socioeconomic History    Marital status:    Tobacco Use    Smoking status: Former     Types: Cigarettes    Smokeless tobacco: Never   Substance and Sexual Activity    Alcohol use: Yes     Comment: socially every few months    Drug use: Never     Social Drivers of Health     Financial Resource Strain: Low Risk  (12/5/2024)    Overall Financial Resource Strain (CARDIA)     Difficulty of Paying Living Expenses: Not hard at all   Food Insecurity: No Food Insecurity (12/5/2024)    Hunger Vital Sign     Worried About Running Out of Food in the Last Year: Never true     Ran Out of Food in the Last Year: Never true   Transportation Needs: No Transportation Needs (12/5/2024)    TRANSPORTATION NEEDS     Transportation : No   Stress: No Stress Concern Present (12/5/2024)    [employer] [place] - Occupational Stress Questionnaire     Feeling of Stress : Not at all   Housing Stability: Low Risk  (12/5/2024)    Housing Stability Vital Sign     Unable to Pay for Housing in the Last Year: No     Homeless in the Last Year: No       MEDICATIONS & ALLERGIES:     No current facility-administered medications on file prior to encounter.     Current Outpatient Medications on File Prior to Encounter   Medication Sig Dispense Refill    amLODIPine-benazepriL (LOTREL) 10-40  mg per capsule amlodipine 10 mg-benazepril 40 mg capsule   TAKE 1 CAPSULE BY MOUTH EVERY DAY      anastrozole (ARIMIDEX) 1 mg Tab Take 1 mg by mouth every 7 days.      bisoprolol (ZEBETA) 10 MG tablet Take 20 mg by mouth.      celecoxib (CELEBREX) 200 MG capsule celecoxib 200 mg capsule   TAKE 1 CAPSULE BY MOUTH EVERY DAY TAKE WITH FOOD      famotidine (PEPCID) 40 MG tablet TAKE ONE TABLET BY MOUTH AT NIGHT      fenofibrate (TRICOR) 48 MG tablet fenofibrate nanocrystallized 48 mg tablet   TAKE 1 TABLET BY MOUTH EVERY DAY FOR TRIGLYCERIDES      furosemide (LASIX) 20 MG tablet Taking one every other day      gabapentin (NEURONTIN) 600 MG tablet gabapentin 600 mg tablet   TAKE ONE TABLET BY MOUTH THREE TIMES DAILY      HYDROcodone-acetaminophen (NORCO)  mg per tablet Take 1 tablet by mouth every evening.      omega-3 fatty acids/fish oil (FISH OIL-OMEGA-3 FATTY ACIDS) 300-1,000 mg capsule Take 2 capsules by mouth once daily.      omeprazole (PRILOSEC) 40 MG capsule TAKE 1 CAPSULE BY MOUTH ONCE DAILY 30 MINUTES BEFORE A MEAL      potassium chloride SA (K-DUR,KLOR-CON) 20 MEQ tablet Taking one every other day      primidone (MYSOLINE) 50 MG Tab       rosuvastatin (CRESTOR) 5 MG tablet       tiZANidine (ZANAFLEX) 4 MG tablet TAKE TWO TABLETS BY MOUTH AT BEDTIME must last 90 DAYS      traZODone (DESYREL) 100 MG tablet trazodone 100 mg tablet   TAKE 1 TABLET BY MOUTH AT BEDTIME      TRELEGY ELLIPTA 100-62.5-25 mcg DsDv       XARELTO 20 mg Tab Take 20 mg by mouth.          Review of patient's allergies indicates:  No Known Allergies    OBJECTIVE:     Vital Signs:  Temp:  [97.8 °F (36.6 °C)-98.5 °F (36.9 °C)] 98.3 °F (36.8 °C)  Pulse:  [63-87] 81  Resp:  [18-22] 20  SpO2:  [92 %-95 %] 95 %  BP: (129-160)/(78-90) 160/83  Body mass index is 30.42 kg/m².     Physical Exam:  Physical Exam  Vitals:    12/06/24 1623   BP: (!) 160/83   Pulse: 81   Resp:    Temp: 98.3 °F (36.8 °C)       Physical Exam   Constitutional: alert &  oriented, no acute distress  HENT:   Head: Normocephalic and atraumatic.   Eyes: Conjunctivae normal and EOM are normal. Pupils are equal, round, and reactive to light.   Neck: Normal range of motion. Neck supple. Large  neck  Voice is hoarse    Cardiovascular: Normal rate, regular rhythm, normal heart sounds   Pulmonary/Chest: CTAB, wheezing but  moving air  Abdominal: Soft, nontender, bowel sounds normal  Neurological: nonfocal  Skin: warm, right leg  w some   redness  Edema slighlty decreased  since prior  day   Pt  has  been  in bed  w  leg  elevated     Psychiatric: normal mood and affect, cooperative        Laboratory  Recent Results (from the past 24 hours)   Comprehensive Metabolic Panel (CMP)    Collection Time: 12/05/24  7:51 PM   Result Value Ref Range    Sodium 140 136 - 145 mmol/L    Potassium 3.9 3.5 - 5.1 mmol/L    Chloride 106 98 - 107 mmol/L    CO2 25 23 - 31 mmol/L    Glucose 115 82 - 115 mg/dL    Blood Urea Nitrogen 19.0 8.4 - 25.7 mg/dL    Creatinine 1.03 0.72 - 1.25 mg/dL    Calcium 9.6 8.8 - 10.0 mg/dL    Protein Total 6.6 5.8 - 7.6 gm/dL    Albumin 2.9 (L) 3.4 - 4.8 g/dL    Globulin 3.7 (H) 2.4 - 3.5 gm/dL    Albumin/Globulin Ratio 0.8 (L) 1.1 - 2.0 ratio    Bilirubin Total 0.6 <=1.5 mg/dL    ALP 33 (L) 40 - 150 unit/L    ALT 41 0 - 55 unit/L    AST 36 (H) 5 - 34 unit/L    eGFR >60 mL/min/1.73/m2    Anion Gap 9.0 mEq/L    BUN/Creatinine Ratio 18    Troponin I    Collection Time: 12/05/24  7:51 PM   Result Value Ref Range    Troponin-I <0.010 0.000 - 0.045 ng/mL   PT/INR    Collection Time: 12/05/24  7:51 PM   Result Value Ref Range    PT 23.7 (H) 12.5 - 14.5 seconds    INR 2.0 (H) <=1.3   CBC with Differential    Collection Time: 12/05/24  7:51 PM   Result Value Ref Range    WBC 10.79 4.50 - 11.50 x10(3)/mcL    RBC 4.02 (L) 4.70 - 6.10 x10(6)/mcL    Hgb 10.6 (L) 14.0 - 18.0 g/dL    Hct 33.3 (L) 42.0 - 52.0 %    MCV 82.8 80.0 - 94.0 fL    MCH 26.4 (L) 27.0 - 31.0 pg    MCHC 31.8 (L) 33.0 - 36.0  g/dL    RDW 14.5 11.5 - 17.0 %    Platelet 224 130 - 400 x10(3)/mcL    MPV 11.4 (H) 7.4 - 10.4 fL    Neut % 75.3 %    Lymph % 14.1 %    Mono % 9.8 %    Eos % 0.1 %    Basophil % 0.1 %    Lymph # 1.52 0.6 - 4.6 x10(3)/mcL    Neut # 8.13 2.1 - 9.2 x10(3)/mcL    Mono # 1.06 0.1 - 1.3 x10(3)/mcL    Eos # 0.01 0 - 0.9 x10(3)/mcL    Baso # 0.01 <=0.2 x10(3)/mcL    IG# 0.06 (H) 0 - 0.04 x10(3)/mcL    IG% 0.6 %       Diagnostic Results:      ASSESSMENT & PLAN:   Patient has an open wound of the right great toe  This is healing he is seeing wound care.  He has a cellulitis of the right anterior lower leg.  He went to the walk-in clinic was given doxycycline this is not improving.  We will start him on clindamycin and vancomycin.  He had a significant amount of swelling and significant amoun of edema on 12/04.  He presented to my office for a clinic appointment.  I did send him for a venous ultrasound that was negative.  He is on Xarelto making it possibility of a clot less likely.  He will be placed on vancomycin clinda.    COPD, exacerbation   Patient with a history of COPD takes Trelegy at home as well as nebulizers.  He is still having significant shortness of breath and wheezing despite having been on these inhalers.  I will start him on Solu-Medrol    Where this is well.  For insomnia and polyneuropathy he will continue his regular home meds of gabapentin and trazodone.    Anticipated stay is 48 hours or greater to help clear at the cellulitis and start with resolution.  There are no open wounds to culture.  The toe wound that he has been treating it wound cares not draining.

## 2024-12-06 NOTE — PROGRESS NOTES
"Pharmacokinetic Initial Assessment: IV Vancomycin    Assessment/Plan:    Initiate intravenous vancomycin with loading dose of 1750 mg once followed by a maintenance dose of vancomycin 750mg IV every 12 hours  Desired empiric serum trough concentration is 10 to 15 mcg/mL  Draw vancomycin trough level 60 min prior to fourth dose on 12/08/2024 at approximately 1200  Pharmacy will continue to follow and monitor vancomycin.      Please contact pharmacy with any questions regarding this assessment.     Thank you for the consult,   Tye Christopher       Patient brief summary:  Paulo Dickerson is a 69 y.o. male initiated on antimicrobial therapy with IV Vancomycin for treatment of suspected skin & soft tissue infection    Drug Allergies:   Review of patient's allergies indicates:  No Known Allergies    Actual Body Weight:   96.2 kg    Renal Function:   Estimated Creatinine Clearance: 78.8 mL/min (based on SCr of 1.03 mg/dL).,       CBC (last 72 hours):  Recent Labs   Lab Result Units 12/04/24  1042 12/05/24 1951   WBC x10(3)/mcL 9.71 10.79   Hgb g/dL 11.7* 10.6*   Hct % 36.8* 33.3*   Platelet x10(3)/mcL 210 224   Mono % % 11.7 9.8   Eos % % 2.0 0.1   Basophil % % 0.3 0.1       Metabolic Panel (last 72 hours):  Recent Labs   Lab Result Units 12/04/24  1042 12/05/24  1951   Sodium mmol/L 139 140   Potassium mmol/L 3.8 3.9   Chloride mmol/L 102 106   CO2 mmol/L 28 25   Glucose mg/dL 97 115   Blood Urea Nitrogen mg/dL 26.0* 19.0   Creatinine mg/dL 1.57* 1.03   Albumin g/dL 3.1* 2.9*   Bilirubin Total mg/dL 0.5 0.6   ALP unit/L 39* 33*   AST unit/L 50* 36*   ALT unit/L 44 41       Drug levels (last 3 results):  No results for input(s): "VANCOMYCINRA", "VANCORANDOM", "VANCOMYCINPE", "VANCOPEAK", "VANCOMYCINTR", "VANCOTROUGH" in the last 72 hours.    Microbiologic Results:  Microbiology Results (last 7 days)       ** No results found for the last 168 hours. **            "

## 2024-12-06 NOTE — PLAN OF CARE
Problem: Adult Inpatient Plan of Care  Goal: Plan of Care Review  Outcome: Progressing  Goal: Patient-Specific Goal (Individualized)  Outcome: Progressing  Goal: Absence of Hospital-Acquired Illness or Injury  Outcome: Progressing  Goal: Optimal Comfort and Wellbeing  Outcome: Progressing  Goal: Readiness for Transition of Care  Outcome: Progressing     Problem: Wound  Goal: Optimal Coping  Outcome: Progressing  Goal: Optimal Functional Ability  Outcome: Progressing  Goal: Absence of Infection Signs and Symptoms  Outcome: Progressing  Goal: Improved Oral Intake  Outcome: Progressing  Goal: Optimal Pain Control and Function  Outcome: Progressing  Goal: Skin Health and Integrity  Outcome: Progressing  Goal: Optimal Wound Healing  Outcome: Progressing     Problem: Comorbidity Management  Goal: Blood Pressure in Desired Range  Outcome: Progressing     Problem: Pain Acute  Goal: Optimal Pain Control and Function  Outcome: Progressing

## 2024-12-07 LAB
ALBUMIN SERPL-MCNC: 2.7 G/DL (ref 3.4–4.8)
ALBUMIN/GLOB SERPL: 0.8 RATIO (ref 1.1–2)
ALP SERPL-CCNC: 30 UNIT/L (ref 40–150)
ALT SERPL-CCNC: 29 UNIT/L (ref 0–55)
ANION GAP SERPL CALC-SCNC: 7 MEQ/L
AST SERPL-CCNC: 17 UNIT/L (ref 5–34)
BASOPHILS # BLD AUTO: 0.01 X10(3)/MCL
BASOPHILS NFR BLD AUTO: 0.1 %
BILIRUB SERPL-MCNC: 0.5 MG/DL
BUN SERPL-MCNC: 18 MG/DL (ref 8.4–25.7)
CALCIUM SERPL-MCNC: 8.9 MG/DL (ref 8.8–10)
CHLORIDE SERPL-SCNC: 104 MMOL/L (ref 98–107)
CO2 SERPL-SCNC: 28 MMOL/L (ref 23–31)
CREAT SERPL-MCNC: 0.99 MG/DL (ref 0.72–1.25)
CREAT/UREA NIT SERPL: 18
EOSINOPHIL # BLD AUTO: 0 X10(3)/MCL (ref 0–0.9)
EOSINOPHIL NFR BLD AUTO: 0 %
ERYTHROCYTE [DISTWIDTH] IN BLOOD BY AUTOMATED COUNT: 14.6 % (ref 11.5–17)
GFR SERPLBLD CREATININE-BSD FMLA CKD-EPI: >60 ML/MIN/1.73/M2
GLOBULIN SER-MCNC: 3.3 GM/DL (ref 2.4–3.5)
GLUCOSE SERPL-MCNC: 133 MG/DL (ref 82–115)
HCT VFR BLD AUTO: 32.7 % (ref 42–52)
HGB BLD-MCNC: 10.3 G/DL (ref 14–18)
IMM GRANULOCYTES # BLD AUTO: 0.1 X10(3)/MCL (ref 0–0.04)
IMM GRANULOCYTES NFR BLD AUTO: 0.8 %
LYMPHOCYTES # BLD AUTO: 1.45 X10(3)/MCL (ref 0.6–4.6)
LYMPHOCYTES NFR BLD AUTO: 11.8 %
MAGNESIUM SERPL-MCNC: 2 MG/DL (ref 1.6–2.6)
MCH RBC QN AUTO: 26.4 PG (ref 27–31)
MCHC RBC AUTO-ENTMCNC: 31.5 G/DL (ref 33–36)
MCV RBC AUTO: 83.8 FL (ref 80–94)
MONOCYTES # BLD AUTO: 0.9 X10(3)/MCL (ref 0.1–1.3)
MONOCYTES NFR BLD AUTO: 7.4 %
MRSA PCR SCRN (OHS): NOT DETECTED
NEUTROPHILS # BLD AUTO: 9.78 X10(3)/MCL (ref 2.1–9.2)
NEUTROPHILS NFR BLD AUTO: 79.9 %
PHOSPHATE SERPL-MCNC: 3.6 MG/DL (ref 2.3–4.7)
PLATELET # BLD AUTO: 222 X10(3)/MCL (ref 130–400)
PMV BLD AUTO: 12 FL (ref 7.4–10.4)
POTASSIUM SERPL-SCNC: 3.9 MMOL/L (ref 3.5–5.1)
PROT SERPL-MCNC: 6 GM/DL (ref 5.8–7.6)
RBC # BLD AUTO: 3.9 X10(6)/MCL (ref 4.7–6.1)
SODIUM SERPL-SCNC: 139 MMOL/L (ref 136–145)
WBC # BLD AUTO: 12.24 X10(3)/MCL (ref 4.5–11.5)

## 2024-12-07 PROCEDURE — 99900035 HC TECH TIME PER 15 MIN (STAT)

## 2024-12-07 PROCEDURE — 25000003 PHARM REV CODE 250: Performed by: FAMILY MEDICINE

## 2024-12-07 PROCEDURE — 63600175 PHARM REV CODE 636 W HCPCS: Mod: JZ,JG | Performed by: FAMILY MEDICINE

## 2024-12-07 PROCEDURE — 87040 BLOOD CULTURE FOR BACTERIA: CPT | Performed by: FAMILY MEDICINE

## 2024-12-07 PROCEDURE — 36415 COLL VENOUS BLD VENIPUNCTURE: CPT | Performed by: FAMILY MEDICINE

## 2024-12-07 PROCEDURE — 11000001 HC ACUTE MED/SURG PRIVATE ROOM

## 2024-12-07 PROCEDURE — 94640 AIRWAY INHALATION TREATMENT: CPT

## 2024-12-07 PROCEDURE — 94761 N-INVAS EAR/PLS OXIMETRY MLT: CPT

## 2024-12-07 PROCEDURE — 83735 ASSAY OF MAGNESIUM: CPT | Performed by: FAMILY MEDICINE

## 2024-12-07 PROCEDURE — 25000242 PHARM REV CODE 250 ALT 637 W/ HCPCS: Performed by: FAMILY MEDICINE

## 2024-12-07 PROCEDURE — 80053 COMPREHEN METABOLIC PANEL: CPT | Performed by: FAMILY MEDICINE

## 2024-12-07 PROCEDURE — 85025 COMPLETE CBC W/AUTO DIFF WBC: CPT | Performed by: FAMILY MEDICINE

## 2024-12-07 PROCEDURE — 84100 ASSAY OF PHOSPHORUS: CPT | Performed by: FAMILY MEDICINE

## 2024-12-07 RX ORDER — BISOPROLOL FUMARATE 10 MG/1
20 TABLET, FILM COATED ORAL DAILY
Status: DISCONTINUED | OUTPATIENT
Start: 2024-12-07 | End: 2024-12-09 | Stop reason: HOSPADM

## 2024-12-07 RX ORDER — GUAIFENESIN AND DEXTROMETHORPHAN HYDROBROMIDE 10; 100 MG/5ML; MG/5ML
5 SYRUP ORAL EVERY 6 HOURS PRN
Status: DISCONTINUED | OUTPATIENT
Start: 2024-12-07 | End: 2024-12-09 | Stop reason: HOSPADM

## 2024-12-07 RX ORDER — CLINDAMYCIN IN PERCENT DEXTROSE 6 MG/ML
300 INJECTION, SOLUTION INTRAVENOUS
Status: DISCONTINUED | OUTPATIENT
Start: 2024-12-07 | End: 2024-12-09

## 2024-12-07 RX ORDER — BENZONATATE 100 MG/1
100 CAPSULE ORAL EVERY 6 HOURS PRN
Status: DISCONTINUED | OUTPATIENT
Start: 2024-12-07 | End: 2024-12-07

## 2024-12-07 RX ORDER — CLINDAMYCIN HYDROCHLORIDE 150 MG/1
300 CAPSULE ORAL EVERY 8 HOURS
Status: DISCONTINUED | OUTPATIENT
Start: 2024-12-07 | End: 2024-12-07

## 2024-12-07 RX ADMIN — VANCOMYCIN HYDROCHLORIDE 750 MG: 750 INJECTION, POWDER, LYOPHILIZED, FOR SOLUTION INTRAVENOUS at 12:12

## 2024-12-07 RX ADMIN — GUAIFENESIN AND DEXTROMETHORPHAN 5 ML: 100; 10 SYRUP ORAL at 06:12

## 2024-12-07 RX ADMIN — IPRATROPIUM BROMIDE AND ALBUTEROL SULFATE 3 ML: 2.5; .5 SOLUTION RESPIRATORY (INHALATION) at 07:12

## 2024-12-07 RX ADMIN — SODIUM CHLORIDE, POTASSIUM CHLORIDE, SODIUM LACTATE AND CALCIUM CHLORIDE: 600; 310; 30; 20 INJECTION, SOLUTION INTRAVENOUS at 03:12

## 2024-12-07 RX ADMIN — FUROSEMIDE 20 MG: 20 TABLET ORAL at 09:12

## 2024-12-07 RX ADMIN — ATORVASTATIN CALCIUM 20 MG: 20 TABLET, FILM COATED ORAL at 09:12

## 2024-12-07 RX ADMIN — GABAPENTIN 600 MG: 300 CAPSULE ORAL at 02:12

## 2024-12-07 RX ADMIN — FENOFIBRATE 48 MG: 48 TABLET, FILM COATED ORAL at 09:12

## 2024-12-07 RX ADMIN — CLINDAMYCIN PHOSPHATE 300 MG: 300 INJECTION, SOLUTION INTRAVENOUS at 03:12

## 2024-12-07 RX ADMIN — CLINDAMYCIN PHOSPHATE 300 MG: 300 INJECTION, SOLUTION INTRAVENOUS at 10:12

## 2024-12-07 RX ADMIN — BUDESONIDE INHALATION 0.5 MG: 0.5 SUSPENSION RESPIRATORY (INHALATION) at 07:12

## 2024-12-07 RX ADMIN — TIZANIDINE 8 MG: 2 TABLET ORAL at 09:12

## 2024-12-07 RX ADMIN — CLINDAMYCIN PHOSPHATE 300 MG: 300 INJECTION, SOLUTION INTRAVENOUS at 09:12

## 2024-12-07 RX ADMIN — IPRATROPIUM BROMIDE AND ALBUTEROL SULFATE 3 ML: 2.5; .5 SOLUTION RESPIRATORY (INHALATION) at 08:12

## 2024-12-07 RX ADMIN — HYDROCODONE BITARTRATE AND ACETAMINOPHEN 1 TABLET: 10; 325 TABLET ORAL at 09:12

## 2024-12-07 RX ADMIN — VANCOMYCIN HYDROCHLORIDE 750 MG: 750 INJECTION, POWDER, LYOPHILIZED, FOR SOLUTION INTRAVENOUS at 01:12

## 2024-12-07 RX ADMIN — FLUTICASONE FUROATE, UMECLIDINIUM BROMIDE AND VILANTEROL TRIFENATATE 1 PUFF: 100; 62.5; 25 POWDER RESPIRATORY (INHALATION) at 12:12

## 2024-12-07 RX ADMIN — METHYLPREDNISOLONE SODIUM SUCCINATE 80 MG: 40 INJECTION, POWDER, FOR SOLUTION INTRAMUSCULAR; INTRAVENOUS at 03:12

## 2024-12-07 RX ADMIN — FAMOTIDINE 20 MG: 20 TABLET, FILM COATED ORAL at 09:12

## 2024-12-07 RX ADMIN — HYDROCODONE BITARTRATE AND ACETAMINOPHEN 1 TABLET: 5; 325 TABLET ORAL at 01:12

## 2024-12-07 RX ADMIN — RIVAROXABAN 20 MG: 10 TABLET, FILM COATED ORAL at 04:12

## 2024-12-07 RX ADMIN — AMLODIPINE BESYLATE 10 MG: 10 TABLET ORAL at 09:12

## 2024-12-07 RX ADMIN — IPRATROPIUM BROMIDE AND ALBUTEROL SULFATE 3 ML: 2.5; .5 SOLUTION RESPIRATORY (INHALATION) at 12:12

## 2024-12-07 RX ADMIN — GABAPENTIN 600 MG: 300 CAPSULE ORAL at 09:12

## 2024-12-07 RX ADMIN — OMEGA-3 FATTY ACIDS CAP 1000 MG 2 CAPSULE: 1000 CAP at 09:12

## 2024-12-07 RX ADMIN — BUDESONIDE INHALATION 0.5 MG: 0.5 SUSPENSION RESPIRATORY (INHALATION) at 08:12

## 2024-12-07 RX ADMIN — IPRATROPIUM BROMIDE AND ALBUTEROL SULFATE 3 ML: 2.5; .5 SOLUTION RESPIRATORY (INHALATION) at 01:12

## 2024-12-07 RX ADMIN — TRAZODONE HYDROCHLORIDE 150 MG: 50 TABLET ORAL at 09:12

## 2024-12-07 RX ADMIN — BISOPROLOL FUMARATE 20 MG: 10 TABLET ORAL at 11:12

## 2024-12-07 RX ADMIN — PANTOPRAZOLE SODIUM 40 MG: 40 TABLET, DELAYED RELEASE ORAL at 09:12

## 2024-12-07 RX ADMIN — LISINOPRIL 40 MG: 20 TABLET ORAL at 09:12

## 2024-12-07 RX ADMIN — SODIUM CHLORIDE, POTASSIUM CHLORIDE, SODIUM LACTATE AND CALCIUM CHLORIDE: 600; 310; 30; 20 INJECTION, SOLUTION INTRAVENOUS at 04:12

## 2024-12-07 NOTE — PROGRESS NOTES
[facility]  [facility] Medicine  Progress Note    Patient Name: Paulo Dickerson  MRN: [id number]  Patient Class: IP- Inpatient   Admission Date: 12/5/2024  Length of Stay: 1 days  Attending Physician: Cristel Moura MD  Primary Care Provider: Cristel Moura MD        Subjective     Principal Problem:<principal problem not specified>        HPI:  No notes on file    Overview/Hospital Course:  Wound culture is pending blood culture as well.      Pain well-controlled has much less redness today than prior less swelling.      Found his breathing has improved a lot no wheezing no shortness a breath at this time no chest pains no palpitations    Interval History: 1 day    Review of Systems   Constitutional: Negative.    HENT: Negative.     Eyes: Negative.    Respiratory:  Negative for cough, chest tightness, shortness of breath and wheezing.    Cardiovascular: Negative.    Gastrointestinal: Negative.    Endocrine: Negative.    Genitourinary: Negative.    Musculoskeletal: Negative.    Skin:  Positive for pallor.   Allergic/Immunologic: Negative.    Neurological: Negative.    Hematological: Negative.    Psychiatric/Behavioral: Negative.       Objective:     Vital Signs (Most Recent):  Temp: 98.6 °F (37 °C) (12/07/24 1129)  Pulse: 71 (12/07/24 1129)  Resp: 20 (12/07/24 0828)  BP: 136/84 (12/07/24 1129)  SpO2: (!) 94 % (12/07/24 1129) Vital Signs (24h Range):  Temp:  [97.7 °F (36.5 °C)-98.6 °F (37 °C)] 98.6 °F (37 °C)  Pulse:  [57-81] 71  Resp:  [18-20] 20  SpO2:  [90 %-95 %] 94 %  BP: (119-160)/(71-92) 136/84     Weight: 96.2 kg (212 lb)  Body mass index is 30.42 kg/m².    Intake/Output Summary (Last 24 hours) at 12/7/2024 1158  Last data filed at 12/7/2024 0954  Gross per 24 hour   Intake 2612.39 ml   Output 1175 ml   Net 1437.39 ml         Physical Exam  Constitutional:       General: He is not in acute distress.     Appearance: Normal appearance. He is obese.   HENT:      Head:  Normocephalic and atraumatic.      Nose: Nose normal.      Mouth/Throat:      Mouth: Mucous membranes are moist.      Pharynx: Oropharynx is clear.   Eyes:      Extraocular Movements: Extraocular movements intact.      Pupils: Pupils are equal, round, and reactive to light.   Cardiovascular:      Rate and Rhythm: Normal rate and regular rhythm.      Heart sounds: No murmur heard.     No gallop.   Pulmonary:      Effort: Pulmonary effort is normal.      Breath sounds: Normal breath sounds.   Abdominal:      General: Abdomen is flat. Bowel sounds are normal.      Palpations: Abdomen is soft.   Musculoskeletal:         General: Normal range of motion.      Cervical back: Normal range of motion and neck supple. No tenderness.   Skin:     Comments: Mild redness rl ext ,  minimal swelling   Neurological:      General: No focal deficit present.      Mental Status: He is alert and oriented to person, place, and time. Mental status is at baseline.   Psychiatric:         Mood and Affect: Mood normal.         Behavior: Behavior normal.         Thought Content: Thought content normal.             Significant Labs: All pertinent labs within the past 24 hours have been reviewed.  CBC:   Recent Labs   Lab 12/05/24 1951 12/07/24  0450   WBC 10.79 12.24*   HGB 10.6* 10.3*   HCT 33.3* 32.7*    222     CMP:   Recent Labs   Lab 12/05/24 1951 12/07/24  0450    139   K 3.9 3.9    104   CO2 25 28   BUN 19.0 18.0   CREATININE 1.03 0.99   CALCIUM 9.6 8.9   ALBUMIN 2.9* 2.7*   BILITOT 0.6 0.5   ALKPHOS 33* 30*   AST 36* 17   ALT 41 29       Significant Imaging: I have reviewed all pertinent imaging results/findings within the past 24 hours.    Assessment and Plan     COPD exacerbation  Patient's COPD is controlled currently.  Patient is currently off COPD Pathway. Continue scheduled inhalers Antibiotics and monitor respiratory status closely.     Insomnia  Sleep PRN      Hyperlipidemia  Called statin this  time      Cellulitis of right anterior lower leg  Continue IV vancomycin follow up culture still has leukocytosis at this time we will follow up closely      Open wound of right great toe  Continue IV vancomycin follow cultures        VTE Risk Mitigation (From admission, onward)           Ordered     rivaroxaban tablet 20 mg  with dinner         12/06/24 0631     IP VTE LOW RISK PATIENT  Once         12/05/24 1925     Place sequential compression device  Until discontinued         12/05/24 1925                    Discharge Planning   ANI:      Code Status: Full Code   Medical Readiness for Discharge Date:                            Drake Prieto MD  Department of Hospital Medicine   [facility]

## 2024-12-07 NOTE — ASSESSMENT & PLAN NOTE
Patient's COPD is controlled currently.  Patient is currently off COPD Pathway. Continue scheduled inhalers Antibiotics and monitor respiratory status closely.

## 2024-12-07 NOTE — SUBJECTIVE & OBJECTIVE
Interval History: 1 day    Review of Systems   Constitutional: Negative.    HENT: Negative.     Eyes: Negative.    Respiratory:  Negative for cough, chest tightness, shortness of breath and wheezing.    Cardiovascular: Negative.    Gastrointestinal: Negative.    Endocrine: Negative.    Genitourinary: Negative.    Musculoskeletal: Negative.    Skin:  Positive for pallor.   Allergic/Immunologic: Negative.    Neurological: Negative.    Hematological: Negative.    Psychiatric/Behavioral: Negative.       Objective:     Vital Signs (Most Recent):  Temp: 98.6 °F (37 °C) (12/07/24 1129)  Pulse: 71 (12/07/24 1129)  Resp: 20 (12/07/24 0828)  BP: 136/84 (12/07/24 1129)  SpO2: (!) 94 % (12/07/24 1129) Vital Signs (24h Range):  Temp:  [97.7 °F (36.5 °C)-98.6 °F (37 °C)] 98.6 °F (37 °C)  Pulse:  [57-81] 71  Resp:  [18-20] 20  SpO2:  [90 %-95 %] 94 %  BP: (119-160)/(71-92) 136/84     Weight: 96.2 kg (212 lb)  Body mass index is 30.42 kg/m².    Intake/Output Summary (Last 24 hours) at 12/7/2024 1158  Last data filed at 12/7/2024 0954  Gross per 24 hour   Intake 2612.39 ml   Output 1175 ml   Net 1437.39 ml         Physical Exam  Constitutional:       General: He is not in acute distress.     Appearance: Normal appearance. He is obese.   HENT:      Head: Normocephalic and atraumatic.      Nose: Nose normal.      Mouth/Throat:      Mouth: Mucous membranes are moist.      Pharynx: Oropharynx is clear.   Eyes:      Extraocular Movements: Extraocular movements intact.      Pupils: Pupils are equal, round, and reactive to light.   Cardiovascular:      Rate and Rhythm: Normal rate and regular rhythm.      Heart sounds: No murmur heard.     No gallop.   Pulmonary:      Effort: Pulmonary effort is normal.      Breath sounds: Normal breath sounds.   Abdominal:      General: Abdomen is flat. Bowel sounds are normal.      Palpations: Abdomen is soft.   Musculoskeletal:         General: Normal range of motion.      Cervical back: Normal range of  motion and neck supple. No tenderness.   Skin:     Comments: Mild redness rl ext ,  minimal swelling   Neurological:      General: No focal deficit present.      Mental Status: He is alert and oriented to person, place, and time. Mental status is at baseline.   Psychiatric:         Mood and Affect: Mood normal.         Behavior: Behavior normal.         Thought Content: Thought content normal.             Significant Labs: All pertinent labs within the past 24 hours have been reviewed.  CBC:   Recent Labs   Lab 12/05/24 1951 12/07/24  0450   WBC 10.79 12.24*   HGB 10.6* 10.3*   HCT 33.3* 32.7*    222     CMP:   Recent Labs   Lab 12/05/24 1951 12/07/24  0450    139   K 3.9 3.9    104   CO2 25 28   BUN 19.0 18.0   CREATININE 1.03 0.99   CALCIUM 9.6 8.9   ALBUMIN 2.9* 2.7*   BILITOT 0.6 0.5   ALKPHOS 33* 30*   AST 36* 17   ALT 41 29       Significant Imaging: I have reviewed all pertinent imaging results/findings within the past 24 hours.

## 2024-12-07 NOTE — HOSPITAL COURSE
Wound culture is pending blood culture as well.      Pain well-controlled has much less redness today than prior less swelling.      Found his breathing has improved a lot no wheezing no shortness a breath at this time no chest pains no palpitations  C/o cough

## 2024-12-07 NOTE — ASSESSMENT & PLAN NOTE
Continue IV vancomycin follow up culture still has leukocytosis at this time we will follow up closely

## 2024-12-07 NOTE — PLAN OF CARE
Problem: Adult Inpatient Plan of Care  Goal: Plan of Care Review  Outcome: Not Progressing  Goal: Patient-Specific Goal (Individualized)  Outcome: Not Progressing  Goal: Absence of Hospital-Acquired Illness or Injury  Outcome: Not Progressing  Goal: Optimal Comfort and Wellbeing  Outcome: Not Progressing  Goal: Readiness for Transition of Care  Outcome: Not Progressing     Problem: Wound  Goal: Optimal Coping  Outcome: Not Progressing  Goal: Optimal Functional Ability  Outcome: Not Progressing  Goal: Absence of Infection Signs and Symptoms  Outcome: Not Progressing  Goal: Improved Oral Intake  Outcome: Not Progressing  Goal: Optimal Pain Control and Function  Outcome: Not Progressing  Goal: Skin Health and Integrity  Outcome: Not Progressing  Goal: Optimal Wound Healing  Outcome: Not Progressing     Problem: Comorbidity Management  Goal: Blood Pressure in Desired Range  Outcome: Not Progressing     Problem: Pain Acute  Goal: Optimal Pain Control and Function  Outcome: Not Progressing

## 2024-12-08 LAB
ANION GAP SERPL CALC-SCNC: 7 MEQ/L
BASOPHILS # BLD AUTO: 0.01 X10(3)/MCL
BASOPHILS NFR BLD AUTO: 0.1 %
BUN SERPL-MCNC: 17 MG/DL (ref 8.4–25.7)
CALCIUM SERPL-MCNC: 8.8 MG/DL (ref 8.8–10)
CHLORIDE SERPL-SCNC: 103 MMOL/L (ref 98–107)
CO2 SERPL-SCNC: 28 MMOL/L (ref 23–31)
CREAT SERPL-MCNC: 1.09 MG/DL (ref 0.72–1.25)
CREAT/UREA NIT SERPL: 16
EOSINOPHIL # BLD AUTO: 0 X10(3)/MCL (ref 0–0.9)
EOSINOPHIL NFR BLD AUTO: 0 %
ERYTHROCYTE [DISTWIDTH] IN BLOOD BY AUTOMATED COUNT: 14.7 % (ref 11.5–17)
GFR SERPLBLD CREATININE-BSD FMLA CKD-EPI: >60 ML/MIN/1.73/M2
GLUCOSE SERPL-MCNC: 141 MG/DL (ref 82–115)
HCT VFR BLD AUTO: 34.8 % (ref 42–52)
HGB BLD-MCNC: 11 G/DL (ref 14–18)
IMM GRANULOCYTES # BLD AUTO: 0.14 X10(3)/MCL (ref 0–0.04)
IMM GRANULOCYTES NFR BLD AUTO: 1.1 %
LYMPHOCYTES # BLD AUTO: 1.4 X10(3)/MCL (ref 0.6–4.6)
LYMPHOCYTES NFR BLD AUTO: 11.5 %
MCH RBC QN AUTO: 26.5 PG (ref 27–31)
MCHC RBC AUTO-ENTMCNC: 31.6 G/DL (ref 33–36)
MCV RBC AUTO: 83.9 FL (ref 80–94)
MONOCYTES # BLD AUTO: 0.7 X10(3)/MCL (ref 0.1–1.3)
MONOCYTES NFR BLD AUTO: 5.7 %
NEUTROPHILS # BLD AUTO: 9.94 X10(3)/MCL (ref 2.1–9.2)
NEUTROPHILS NFR BLD AUTO: 81.6 %
PLATELET # BLD AUTO: 238 X10(3)/MCL (ref 130–400)
PMV BLD AUTO: 11.6 FL (ref 7.4–10.4)
POTASSIUM SERPL-SCNC: 4 MMOL/L (ref 3.5–5.1)
RBC # BLD AUTO: 4.15 X10(6)/MCL (ref 4.7–6.1)
SODIUM SERPL-SCNC: 138 MMOL/L (ref 136–145)
VANCOMYCIN TROUGH SERPL-MCNC: 8.6 UG/ML (ref 15–20)
WBC # BLD AUTO: 12.19 X10(3)/MCL (ref 4.5–11.5)

## 2024-12-08 PROCEDURE — 36415 COLL VENOUS BLD VENIPUNCTURE: CPT | Performed by: FAMILY MEDICINE

## 2024-12-08 PROCEDURE — 25000003 PHARM REV CODE 250: Performed by: FAMILY MEDICINE

## 2024-12-08 PROCEDURE — 63600175 PHARM REV CODE 636 W HCPCS: Mod: JZ,JG | Performed by: FAMILY MEDICINE

## 2024-12-08 PROCEDURE — 80202 ASSAY OF VANCOMYCIN: CPT | Performed by: FAMILY MEDICINE

## 2024-12-08 PROCEDURE — 94761 N-INVAS EAR/PLS OXIMETRY MLT: CPT

## 2024-12-08 PROCEDURE — 11000001 HC ACUTE MED/SURG PRIVATE ROOM

## 2024-12-08 PROCEDURE — 94640 AIRWAY INHALATION TREATMENT: CPT

## 2024-12-08 PROCEDURE — 63600175 PHARM REV CODE 636 W HCPCS: Performed by: FAMILY MEDICINE

## 2024-12-08 PROCEDURE — 25000242 PHARM REV CODE 250 ALT 637 W/ HCPCS: Performed by: FAMILY MEDICINE

## 2024-12-08 PROCEDURE — 99900035 HC TECH TIME PER 15 MIN (STAT)

## 2024-12-08 PROCEDURE — 85025 COMPLETE CBC W/AUTO DIFF WBC: CPT | Performed by: FAMILY MEDICINE

## 2024-12-08 PROCEDURE — 80048 BASIC METABOLIC PNL TOTAL CA: CPT | Performed by: FAMILY MEDICINE

## 2024-12-08 RX ORDER — PROMETHAZINE HYDROCHLORIDE 6.25 MG/5ML
25 SYRUP ORAL EVERY 6 HOURS PRN
Status: DISCONTINUED | OUTPATIENT
Start: 2024-12-08 | End: 2024-12-09 | Stop reason: HOSPADM

## 2024-12-08 RX ADMIN — OMEGA-3 FATTY ACIDS CAP 1000 MG 2 CAPSULE: 1000 CAP at 08:12

## 2024-12-08 RX ADMIN — LISINOPRIL 40 MG: 20 TABLET ORAL at 08:12

## 2024-12-08 RX ADMIN — FAMOTIDINE 20 MG: 20 TABLET, FILM COATED ORAL at 10:12

## 2024-12-08 RX ADMIN — TRAZODONE HYDROCHLORIDE 150 MG: 50 TABLET ORAL at 10:12

## 2024-12-08 RX ADMIN — GABAPENTIN 600 MG: 300 CAPSULE ORAL at 08:12

## 2024-12-08 RX ADMIN — SODIUM CHLORIDE 1000 MG: 9 INJECTION, SOLUTION INTRAVENOUS at 03:12

## 2024-12-08 RX ADMIN — FUROSEMIDE 20 MG: 20 TABLET ORAL at 08:12

## 2024-12-08 RX ADMIN — IPRATROPIUM BROMIDE AND ALBUTEROL SULFATE 3 ML: 2.5; .5 SOLUTION RESPIRATORY (INHALATION) at 07:12

## 2024-12-08 RX ADMIN — TIZANIDINE 8 MG: 2 TABLET ORAL at 10:12

## 2024-12-08 RX ADMIN — ATORVASTATIN CALCIUM 20 MG: 20 TABLET, FILM COATED ORAL at 08:12

## 2024-12-08 RX ADMIN — FAMOTIDINE 20 MG: 20 TABLET, FILM COATED ORAL at 08:12

## 2024-12-08 RX ADMIN — TIZANIDINE 4 MG: 2 TABLET ORAL at 10:12

## 2024-12-08 RX ADMIN — BUDESONIDE INHALATION 0.5 MG: 0.5 SUSPENSION RESPIRATORY (INHALATION) at 07:12

## 2024-12-08 RX ADMIN — CLINDAMYCIN PHOSPHATE 300 MG: 300 INJECTION, SOLUTION INTRAVENOUS at 01:12

## 2024-12-08 RX ADMIN — VANCOMYCIN HYDROCHLORIDE 750 MG: 750 INJECTION, POWDER, LYOPHILIZED, FOR SOLUTION INTRAVENOUS at 12:12

## 2024-12-08 RX ADMIN — BISOPROLOL FUMARATE 20 MG: 10 TABLET ORAL at 08:12

## 2024-12-08 RX ADMIN — AMLODIPINE BESYLATE 10 MG: 10 TABLET ORAL at 08:12

## 2024-12-08 RX ADMIN — HYDROCODONE BITARTRATE AND ACETAMINOPHEN 1 TABLET: 10; 325 TABLET ORAL at 10:12

## 2024-12-08 RX ADMIN — POTASSIUM CHLORIDE 20 MEQ: 1500 TABLET, EXTENDED RELEASE ORAL at 08:12

## 2024-12-08 RX ADMIN — METHYLPREDNISOLONE SODIUM SUCCINATE 80 MG: 40 INJECTION, POWDER, FOR SOLUTION INTRAMUSCULAR; INTRAVENOUS at 04:12

## 2024-12-08 RX ADMIN — GUAIFENESIN AND DEXTROMETHORPHAN 5 ML: 100; 10 SYRUP ORAL at 08:12

## 2024-12-08 RX ADMIN — PANTOPRAZOLE SODIUM 40 MG: 40 TABLET, DELAYED RELEASE ORAL at 08:12

## 2024-12-08 RX ADMIN — FENOFIBRATE 48 MG: 48 TABLET, FILM COATED ORAL at 08:12

## 2024-12-08 RX ADMIN — CLINDAMYCIN PHOSPHATE 300 MG: 300 INJECTION, SOLUTION INTRAVENOUS at 05:12

## 2024-12-08 RX ADMIN — HYDROCODONE BITARTRATE AND ACETAMINOPHEN 1 TABLET: 5; 325 TABLET ORAL at 08:12

## 2024-12-08 RX ADMIN — CLINDAMYCIN PHOSPHATE 300 MG: 300 INJECTION, SOLUTION INTRAVENOUS at 10:12

## 2024-12-08 RX ADMIN — GABAPENTIN 600 MG: 300 CAPSULE ORAL at 10:12

## 2024-12-08 RX ADMIN — RIVAROXABAN 20 MG: 10 TABLET, FILM COATED ORAL at 05:12

## 2024-12-08 RX ADMIN — FLUTICASONE FUROATE, UMECLIDINIUM BROMIDE AND VILANTEROL TRIFENATATE 1 PUFF: 100; 62.5; 25 POWDER RESPIRATORY (INHALATION) at 08:12

## 2024-12-08 NOTE — SUBJECTIVE & OBJECTIVE
Interval History: 1 day    Review of Systems   Constitutional: Negative.    HENT: Negative.     Eyes: Negative.    Respiratory:  Negative for cough, chest tightness, shortness of breath and wheezing.    Cardiovascular: Negative.    Gastrointestinal: Negative.    Endocrine: Negative.    Genitourinary: Negative.    Musculoskeletal: Negative.    Skin:  Positive for pallor.   Allergic/Immunologic: Negative.    Neurological: Negative.    Hematological: Negative.    Psychiatric/Behavioral: Negative.       Objective:     Vital Signs (Most Recent):  Temp: 97.8 °F (36.6 °C) (12/08/24 1211)  Pulse: 82 (12/08/24 1211)  Resp: 18 (12/08/24 0815)  BP: 137/76 (12/08/24 1211)  SpO2: (!) 94 % (12/08/24 1211) Vital Signs (24h Range):  Temp:  [97.6 °F (36.4 °C)-98.3 °F (36.8 °C)] 97.8 °F (36.6 °C)  Pulse:  [57-98] 82  Resp:  [16-20] 18  SpO2:  [92 %-96 %] 94 %  BP: (130-162)/() 137/76     Weight: 96.2 kg (212 lb)  Body mass index is 30.42 kg/m².    Intake/Output Summary (Last 24 hours) at 12/8/2024 1219  Last data filed at 12/8/2024 1027  Gross per 24 hour   Intake 440 ml   Output 1750 ml   Net -1310 ml         Physical Exam  Constitutional:       General: He is not in acute distress.     Appearance: Normal appearance. He is obese.   HENT:      Head: Normocephalic and atraumatic.      Nose: Nose normal.      Mouth/Throat:      Mouth: Mucous membranes are moist.      Pharynx: Oropharynx is clear.   Eyes:      Extraocular Movements: Extraocular movements intact.      Pupils: Pupils are equal, round, and reactive to light.   Cardiovascular:      Rate and Rhythm: Normal rate and regular rhythm.      Heart sounds: No murmur heard.     No gallop.   Pulmonary:      Effort: Pulmonary effort is normal.      Breath sounds: Normal breath sounds.   Abdominal:      General: Abdomen is flat. Bowel sounds are normal.      Palpations: Abdomen is soft.   Musculoskeletal:         General: Normal range of motion.      Cervical back: Normal range  of motion and neck supple. No tenderness.   Skin:     Comments: Mild redness rl ext ,  minimal swelling   Neurological:      General: No focal deficit present.      Mental Status: He is alert and oriented to person, place, and time. Mental status is at baseline.   Psychiatric:         Mood and Affect: Mood normal.         Behavior: Behavior normal.         Thought Content: Thought content normal.             Significant Labs: All pertinent labs within the past 24 hours have been reviewed.  CBC:   Recent Labs   Lab 12/07/24 0450 12/08/24  0445   WBC 12.24* 12.19*   HGB 10.3* 11.0*   HCT 32.7* 34.8*    238     CMP:   Recent Labs   Lab 12/07/24 0450 12/08/24  0445    138   K 3.9 4.0    103   CO2 28 28   BUN 18.0 17.0   CREATININE 0.99 1.09   CALCIUM 8.9 8.8   ALBUMIN 2.7*  --    BILITOT 0.5  --    ALKPHOS 30*  --    AST 17  --    ALT 29  --        Significant Imaging: I have reviewed all pertinent imaging results/findings within the past 24 hours.

## 2024-12-08 NOTE — ASSESSMENT & PLAN NOTE
Patient's COPD is controlled currently.  Patient is currently off COPD Pathway. Continue scheduled inhalers Antibiotics and monitor respiratory status closely.   Has gauneficin dm for cough, will add phenergen prn

## 2024-12-08 NOTE — PROGRESS NOTES
[facility]  [facility] Medicine  Progress Note    Patient Name: Paulo Dickerson  MRN: [id number]  Patient Class: IP- Inpatient   Admission Date: 12/5/2024  Length of Stay: 2 days  Attending Physician: Cristel Moura MD  Primary Care Provider: Cristel Moura MD        Subjective     Principal Problem:<principal problem not specified>        HPI:  No notes on file    Overview/Hospital Course:  Wound culture is pending blood culture as well.      Pain well-controlled has much less redness today than prior less swelling.      Found his breathing has improved a lot no wheezing no shortness a breath at this time no chest pains no palpitations  C/o cough    Interval History: 1 day    Review of Systems   Constitutional: Negative.    HENT: Negative.     Eyes: Negative.    Respiratory:  Negative for cough, chest tightness, shortness of breath and wheezing.    Cardiovascular: Negative.    Gastrointestinal: Negative.    Endocrine: Negative.    Genitourinary: Negative.    Musculoskeletal: Negative.    Skin:  Positive for pallor.   Allergic/Immunologic: Negative.    Neurological: Negative.    Hematological: Negative.    Psychiatric/Behavioral: Negative.       Objective:     Vital Signs (Most Recent):  Temp: 97.8 °F (36.6 °C) (12/08/24 1211)  Pulse: 82 (12/08/24 1211)  Resp: 18 (12/08/24 0815)  BP: 137/76 (12/08/24 1211)  SpO2: (!) 94 % (12/08/24 1211) Vital Signs (24h Range):  Temp:  [97.6 °F (36.4 °C)-98.3 °F (36.8 °C)] 97.8 °F (36.6 °C)  Pulse:  [57-98] 82  Resp:  [16-20] 18  SpO2:  [92 %-96 %] 94 %  BP: (130-162)/() 137/76     Weight: 96.2 kg (212 lb)  Body mass index is 30.42 kg/m².    Intake/Output Summary (Last 24 hours) at 12/8/2024 1219  Last data filed at 12/8/2024 1027  Gross per 24 hour   Intake 440 ml   Output 1750 ml   Net -1310 ml         Physical Exam  Constitutional:       General: He is not in acute distress.     Appearance: Normal appearance. He is obese.   HENT:       Head: Normocephalic and atraumatic.      Nose: Nose normal.      Mouth/Throat:      Mouth: Mucous membranes are moist.      Pharynx: Oropharynx is clear.   Eyes:      Extraocular Movements: Extraocular movements intact.      Pupils: Pupils are equal, round, and reactive to light.   Cardiovascular:      Rate and Rhythm: Normal rate and regular rhythm.      Heart sounds: No murmur heard.     No gallop.   Pulmonary:      Effort: Pulmonary effort is normal.      Breath sounds: Normal breath sounds.   Abdominal:      General: Abdomen is flat. Bowel sounds are normal.      Palpations: Abdomen is soft.   Musculoskeletal:         General: Normal range of motion.      Cervical back: Normal range of motion and neck supple. No tenderness.   Skin:     Comments: Mild redness rl ext ,  minimal swelling   Neurological:      General: No focal deficit present.      Mental Status: He is alert and oriented to person, place, and time. Mental status is at baseline.   Psychiatric:         Mood and Affect: Mood normal.         Behavior: Behavior normal.         Thought Content: Thought content normal.             Significant Labs: All pertinent labs within the past 24 hours have been reviewed.  CBC:   Recent Labs   Lab 12/07/24  0450 12/08/24  0445   WBC 12.24* 12.19*   HGB 10.3* 11.0*   HCT 32.7* 34.8*    238     CMP:   Recent Labs   Lab 12/07/24  0450 12/08/24  0445    138   K 3.9 4.0    103   CO2 28 28   BUN 18.0 17.0   CREATININE 0.99 1.09   CALCIUM 8.9 8.8   ALBUMIN 2.7*  --    BILITOT 0.5  --    ALKPHOS 30*  --    AST 17  --    ALT 29  --        Significant Imaging: I have reviewed all pertinent imaging results/findings within the past 24 hours.    Assessment and Plan     COPD exacerbation  Patient's COPD is controlled currently.  Patient is currently off COPD Pathway. Continue scheduled inhalers Antibiotics and monitor respiratory status closely.   Has gauneficin dm for cough, will add phenergen  prn    Insomnia  Sleep PRN      Hyperlipidemia  Called statin this time      Cellulitis of right anterior lower leg  Continue IV vancomycin follow up culture still has leukocytosis at this time we will follow up closely      Open wound of right great toe  Continue IV vancomycin follow cultures        VTE Risk Mitigation (From admission, onward)           Ordered     rivaroxaban tablet 20 mg  with dinner         12/06/24 0631     IP VTE LOW RISK PATIENT  Once         12/05/24 1925     Place sequential compression device  Until discontinued         12/05/24 1925                    Discharge Planning   ANI:      Code Status: Full Code   Medical Readiness for Discharge Date:                            Drake Prieto MD  Department of Hospital Medicine   [facility]

## 2024-12-09 VITALS
WEIGHT: 212 LBS | RESPIRATION RATE: 16 BRPM | HEIGHT: 70 IN | HEART RATE: 77 BPM | TEMPERATURE: 98 F | OXYGEN SATURATION: 92 % | SYSTOLIC BLOOD PRESSURE: 138 MMHG | DIASTOLIC BLOOD PRESSURE: 90 MMHG | BODY MASS INDEX: 30.35 KG/M2

## 2024-12-09 LAB
ALBUMIN SERPL-MCNC: 2.7 G/DL (ref 3.4–4.8)
ALBUMIN/GLOB SERPL: 0.8 RATIO (ref 1.1–2)
ALP SERPL-CCNC: 28 UNIT/L (ref 40–150)
ALT SERPL-CCNC: 23 UNIT/L (ref 0–55)
ANION GAP SERPL CALC-SCNC: 8 MEQ/L
AST SERPL-CCNC: 13 UNIT/L (ref 5–34)
BASOPHILS # BLD AUTO: 0.01 X10(3)/MCL
BASOPHILS NFR BLD AUTO: 0.1 %
BILIRUB SERPL-MCNC: 0.3 MG/DL
BUN SERPL-MCNC: 18 MG/DL (ref 8.4–25.7)
CALCIUM SERPL-MCNC: 8.6 MG/DL (ref 8.8–10)
CHLORIDE SERPL-SCNC: 102 MMOL/L (ref 98–107)
CO2 SERPL-SCNC: 30 MMOL/L (ref 23–31)
CREAT SERPL-MCNC: 1.05 MG/DL (ref 0.72–1.25)
CREAT/UREA NIT SERPL: 17
EOSINOPHIL # BLD AUTO: 0 X10(3)/MCL (ref 0–0.9)
EOSINOPHIL NFR BLD AUTO: 0 %
ERYTHROCYTE [DISTWIDTH] IN BLOOD BY AUTOMATED COUNT: 14.5 % (ref 11.5–17)
GFR SERPLBLD CREATININE-BSD FMLA CKD-EPI: >60 ML/MIN/1.73/M2
GLOBULIN SER-MCNC: 3.4 GM/DL (ref 2.4–3.5)
GLUCOSE SERPL-MCNC: 136 MG/DL (ref 82–115)
HCT VFR BLD AUTO: 36.7 % (ref 42–52)
HGB BLD-MCNC: 11.3 G/DL (ref 14–18)
IMM GRANULOCYTES # BLD AUTO: 0.18 X10(3)/MCL (ref 0–0.04)
IMM GRANULOCYTES NFR BLD AUTO: 1.3 %
LYMPHOCYTES # BLD AUTO: 1.61 X10(3)/MCL (ref 0.6–4.6)
LYMPHOCYTES NFR BLD AUTO: 11.6 %
MCH RBC QN AUTO: 26.2 PG (ref 27–31)
MCHC RBC AUTO-ENTMCNC: 30.8 G/DL (ref 33–36)
MCV RBC AUTO: 85.2 FL (ref 80–94)
MONOCYTES # BLD AUTO: 1.26 X10(3)/MCL (ref 0.1–1.3)
MONOCYTES NFR BLD AUTO: 9.1 %
NEUTROPHILS # BLD AUTO: 10.76 X10(3)/MCL (ref 2.1–9.2)
NEUTROPHILS NFR BLD AUTO: 77.9 %
PLATELET # BLD AUTO: 242 X10(3)/MCL (ref 130–400)
PMV BLD AUTO: 11 FL (ref 7.4–10.4)
POTASSIUM SERPL-SCNC: 3.9 MMOL/L (ref 3.5–5.1)
PROT SERPL-MCNC: 6.1 GM/DL (ref 5.8–7.6)
RBC # BLD AUTO: 4.31 X10(6)/MCL (ref 4.7–6.1)
SODIUM SERPL-SCNC: 140 MMOL/L (ref 136–145)
WBC # BLD AUTO: 13.82 X10(3)/MCL (ref 4.5–11.5)

## 2024-12-09 PROCEDURE — 94640 AIRWAY INHALATION TREATMENT: CPT

## 2024-12-09 PROCEDURE — 63600175 PHARM REV CODE 636 W HCPCS: Performed by: FAMILY MEDICINE

## 2024-12-09 PROCEDURE — 25000003 PHARM REV CODE 250: Performed by: FAMILY MEDICINE

## 2024-12-09 PROCEDURE — 63600175 PHARM REV CODE 636 W HCPCS: Mod: JZ,JG | Performed by: FAMILY MEDICINE

## 2024-12-09 PROCEDURE — 80053 COMPREHEN METABOLIC PANEL: CPT | Performed by: FAMILY MEDICINE

## 2024-12-09 PROCEDURE — 36415 COLL VENOUS BLD VENIPUNCTURE: CPT | Performed by: FAMILY MEDICINE

## 2024-12-09 PROCEDURE — 99900035 HC TECH TIME PER 15 MIN (STAT)

## 2024-12-09 PROCEDURE — 25000242 PHARM REV CODE 250 ALT 637 W/ HCPCS: Performed by: FAMILY MEDICINE

## 2024-12-09 PROCEDURE — 94761 N-INVAS EAR/PLS OXIMETRY MLT: CPT

## 2024-12-09 PROCEDURE — 85025 COMPLETE CBC W/AUTO DIFF WBC: CPT | Performed by: FAMILY MEDICINE

## 2024-12-09 RX ORDER — METHYLPREDNISOLONE 4 MG/1
TABLET ORAL
Qty: 21 EACH | Refills: 0 | Status: SHIPPED | OUTPATIENT
Start: 2024-12-09 | End: 2024-12-30

## 2024-12-09 RX ORDER — DOXYCYCLINE HYCLATE 100 MG
100 TABLET ORAL EVERY 12 HOURS
Status: DISCONTINUED | OUTPATIENT
Start: 2024-12-09 | End: 2024-12-09 | Stop reason: HOSPADM

## 2024-12-09 RX ORDER — CLINDAMYCIN HYDROCHLORIDE 300 MG/1
300 CAPSULE ORAL EVERY 8 HOURS
Qty: 21 CAPSULE | Refills: 0 | Status: SHIPPED | OUTPATIENT
Start: 2024-12-09

## 2024-12-09 RX ORDER — CLINDAMYCIN HYDROCHLORIDE 150 MG/1
300 CAPSULE ORAL EVERY 6 HOURS
Status: DISCONTINUED | OUTPATIENT
Start: 2024-12-09 | End: 2024-12-09 | Stop reason: HOSPADM

## 2024-12-09 RX ORDER — IPRATROPIUM BROMIDE AND ALBUTEROL SULFATE 2.5; .5 MG/3ML; MG/3ML
3 SOLUTION RESPIRATORY (INHALATION) EVERY 6 HOURS PRN
Qty: 75 ML | Refills: 0 | Status: SHIPPED | OUTPATIENT
Start: 2024-12-09 | End: 2025-12-09

## 2024-12-09 RX ADMIN — BUDESONIDE INHALATION 0.5 MG: 0.5 SUSPENSION RESPIRATORY (INHALATION) at 07:12

## 2024-12-09 RX ADMIN — LISINOPRIL 40 MG: 20 TABLET ORAL at 08:12

## 2024-12-09 RX ADMIN — POTASSIUM CHLORIDE 20 MEQ: 1500 TABLET, EXTENDED RELEASE ORAL at 08:12

## 2024-12-09 RX ADMIN — CLINDAMYCIN PHOSPHATE 300 MG: 300 INJECTION, SOLUTION INTRAVENOUS at 05:12

## 2024-12-09 RX ADMIN — ATORVASTATIN CALCIUM 20 MG: 20 TABLET, FILM COATED ORAL at 08:12

## 2024-12-09 RX ADMIN — HYDROCODONE BITARTRATE AND ACETAMINOPHEN 1 TABLET: 5; 325 TABLET ORAL at 07:12

## 2024-12-09 RX ADMIN — SODIUM CHLORIDE 1000 MG: 9 INJECTION, SOLUTION INTRAVENOUS at 01:12

## 2024-12-09 RX ADMIN — METHYLPREDNISOLONE SODIUM SUCCINATE 80 MG: 40 INJECTION, POWDER, FOR SOLUTION INTRAMUSCULAR; INTRAVENOUS at 04:12

## 2024-12-09 RX ADMIN — BISOPROLOL FUMARATE 20 MG: 10 TABLET ORAL at 09:12

## 2024-12-09 RX ADMIN — FENOFIBRATE 48 MG: 48 TABLET, FILM COATED ORAL at 08:12

## 2024-12-09 RX ADMIN — GUAIFENESIN AND DEXTROMETHORPHAN 5 ML: 100; 10 SYRUP ORAL at 07:12

## 2024-12-09 RX ADMIN — GABAPENTIN 600 MG: 300 CAPSULE ORAL at 07:12

## 2024-12-09 RX ADMIN — FAMOTIDINE 20 MG: 20 TABLET, FILM COATED ORAL at 08:12

## 2024-12-09 RX ADMIN — PANTOPRAZOLE SODIUM 40 MG: 40 TABLET, DELAYED RELEASE ORAL at 08:12

## 2024-12-09 RX ADMIN — FLUTICASONE FUROATE, UMECLIDINIUM BROMIDE AND VILANTEROL TRIFENATATE 1 PUFF: 100; 62.5; 25 POWDER RESPIRATORY (INHALATION) at 09:12

## 2024-12-09 RX ADMIN — OMEGA-3 FATTY ACIDS CAP 1000 MG 2 CAPSULE: 1000 CAP at 08:12

## 2024-12-09 RX ADMIN — FUROSEMIDE 20 MG: 20 TABLET ORAL at 08:12

## 2024-12-09 RX ADMIN — DOXYCYCLINE HYCLATE 100 MG: 100 TABLET, COATED ORAL at 08:12

## 2024-12-09 RX ADMIN — AMLODIPINE BESYLATE 10 MG: 10 TABLET ORAL at 08:12

## 2024-12-09 RX ADMIN — IPRATROPIUM BROMIDE AND ALBUTEROL SULFATE 3 ML: 2.5; .5 SOLUTION RESPIRATORY (INHALATION) at 07:12

## 2024-12-09 NOTE — PLAN OF CARE
Scarlet asked for order for nebulizer to be electronically signed by MD and for the MD to put in notes which nebulizer meds he is d/c on. Informed Dr. Moura.  Neb order signed and sent to Scarlet.

## 2024-12-09 NOTE — DISCHARGE SUMMARY
DISCHARGE SUMMARY  Hospital Medicine    Team: Networked reference to record PCT     Patient Name: Paulo Dickerson  YOB: 1955    Admit Date: 12/5/2024    Discharge Date: 12/09/2024    Discharge Attending Physician: Cristel Moura MD     Diagnoses:  Active Hospital Problems    Diagnosis  POA    *Cellulitis of right anterior lower leg [L03.115]  Yes    COPD exacerbation [J44.1]  Yes    Hyperlipidemia [E78.5]  Yes    Insomnia [G47.00]  Yes    Polyneuropathy [G62.9]  Yes    Open wound of right great toe [S91.101A]  Yes      Resolved Hospital Problems   No resolved problems to display.       Discharged Condition: admit problems have stabilized       HOSPITAL COURSE:    Initial Presentation:    Mr. Paulo Dickerson is a 69 y.o.  history of neuropathy.  History of multiple nonhealing wounds.  Presented to my office today prior to admission with red leg.  He had had of a visit to walk-in clinic and they gave him doxycycline.  The redness is slightly improved.  The redness and swelling in his leg was notable.  I did do a venous ultrasound which ruled out a blood clot.  Calf muscle was 44 cm on the right and 41 on the left.  I offered admission the day of the visit and the patient declined.  He called back the next day wanting to be admitted for direct admission IV antibiotics and also patient has a history of COPD and was having increased wheezing he was also wanting to have steroids for his wheezing and COPD     He  was  placed on IV clinda and IV  vanc  and was  also started on iv solumedrol  and duoneb  treatments.  His  leg  is  almost back to normal today.  Dc home on   doxy and clinda. Also, dc  home  w   duoneb and nebulizer         CONSULTS:  none           Pertinent/Significant Diagnostic Studies:    Neg  venous uls    Special Treatments/Procedures: none    Disposition:  Home        Future Scheduled Appointments:  Future Appointments   Date Time Provider Department Center   1/8/2025 10:00 AM Dennys  "MAIRA Cruz [facility]       Follow-up Plans from This Hospitalization:  Follow up  dr walker and wound care     Last CBC/BMP/HgbA1c (if applicable):  Recent Results (from the past 2 weeks)   CBC with Differential    Collection Time: 12/09/24  4:29 AM   Result Value Ref Range    WBC 13.82 (H) 4.50 - 11.50 x10(3)/mcL    Hgb 11.3 (L) 14.0 - 18.0 g/dL    Hct 36.7 (L) 42.0 - 52.0 %    Platelet 242 130 - 400 x10(3)/mcL   CBC with Differential    Collection Time: 12/08/24  4:45 AM   Result Value Ref Range    WBC 12.19 (H) 4.50 - 11.50 x10(3)/mcL    Hgb 11.0 (L) 14.0 - 18.0 g/dL    Hct 34.8 (L) 42.0 - 52.0 %    Platelet 238 130 - 400 x10(3)/mcL   CBC with Differential    Collection Time: 12/07/24  4:50 AM   Result Value Ref Range    WBC 12.24 (H) 4.50 - 11.50 x10(3)/mcL    Hgb 10.3 (L) 14.0 - 18.0 g/dL    Hct 32.7 (L) 42.0 - 52.0 %    Platelet 222 130 - 400 x10(3)/mcL     Recent Results (from the past 2 weeks)   Basic Metabolic Panel    Collection Time: 12/08/24  4:45 AM   Result Value Ref Range    Sodium 138 136 - 145 mmol/L    Potassium 4.0 3.5 - 5.1 mmol/L    Chloride 103 98 - 107 mmol/L    CO2 28 23 - 31 mmol/L    Glucose 141 (H) 82 - 115 mg/dL    Blood Urea Nitrogen 17.0 8.4 - 25.7 mg/dL    Creatinine 1.09 0.72 - 1.25 mg/dL    Calcium 8.8 8.8 - 10.0 mg/dL     No results found for: "HGBA1C"    Discharge Medication List:     Medication List        START taking these medications      albuterol-ipratropium 2.5 mg-0.5 mg/3 mL nebulizer solution  Commonly known as: DUO-NEB  Take 3 mLs by nebulization every 6 (six) hours as needed for Wheezing. Rescue     clindamycin 300 MG capsule  Commonly known as: CLEOCIN  Take 1 capsule (300 mg total) by mouth every 8 (eight) hours.     methylPREDNISolone 4 mg tablet  Commonly known as: MEDROL DOSEPACK  use as directed            CONTINUE taking these medications      amLODIPine-benazepriL 10-40 mg per capsule  Commonly known as: LOTREL     anastrozole 1 mg Tab  Commonly " known as: ARIMIDEX     bisoprolol 10 MG tablet  Commonly known as: ZEBETA     celecoxib 200 MG capsule  Commonly known as: CeleBREX     famotidine 40 MG tablet  Commonly known as: PEPCID     fenofibrate 48 MG tablet  Commonly known as: TRICOR     fish oil-omega-3 fatty acids 300-1,000 mg capsule     furosemide 20 MG tablet  Commonly known as: LASIX     gabapentin 600 MG tablet  Commonly known as: NEURONTIN     HYDROcodone-acetaminophen  mg per tablet  Commonly known as: NORCO     omeprazole 40 MG capsule  Commonly known as: PRILOSEC     potassium chloride SA 20 MEQ tablet  Commonly known as: K-DUR,KLOR-CON     primidone 50 MG Tab  Commonly known as: MYSOLINE     rosuvastatin 5 MG tablet  Commonly known as: CRESTOR     tiZANidine 4 MG tablet  Commonly known as: ZANAFLEX     traZODone 100 MG tablet  Commonly known as: DESYREL     TRELEGY ELLIPTA 100-62.5-25 mcg Dsdv  Generic drug: fluticasone-umeclidin-vilanter     XARELTO 20 mg Tab  Generic drug: rivaroxaban               Where to Get Your Medications        These medications were sent to [place] - [address] [address]      Phone: [phone]   albuterol-ipratropium 2.5 mg-0.5 mg/3 mL nebulizer solution  clindamycin 300 MG capsule  methylPREDNISolone 4 mg tablet              Time spent on Discharge:  Total time spent on discharging patient, reconciling medications and problems and documentation in the medical record was > 35 minutes    Signing Physician:  avani saldana md

## 2024-12-11 ENCOUNTER — PATIENT OUTREACH (OUTPATIENT)
Dept: ADMINISTRATIVE | Facility: CLINIC | Age: 69
End: 2024-12-11
Payer: MEDICARE

## 2024-12-11 NOTE — PROGRESS NOTES
C3 nurse spoke with Paulo Dickerson  for a [facility] post hospital discharge follow up call. The patient has a scheduled HOSFU appointment with Cristel Moura MD  on 12/16/2024 @ 920 am.

## 2024-12-12 LAB — BACTERIA BLD CULT: NORMAL

## 2024-12-20 ENCOUNTER — HOSPITAL ENCOUNTER (OUTPATIENT)
Dept: WOUND CARE | Facility: HOSPITAL | Age: 69
Discharge: HOME | End: 2024-12-20
Attending: FAMILY MEDICINE
Payer: MEDICARE

## 2024-12-20 VITALS
WEIGHT: 212.06 LBS | SYSTOLIC BLOOD PRESSURE: 134 MMHG | HEIGHT: 70 IN | DIASTOLIC BLOOD PRESSURE: 87 MMHG | RESPIRATION RATE: 18 BRPM | HEART RATE: 74 BPM | BODY MASS INDEX: 30.36 KG/M2

## 2024-12-20 DIAGNOSIS — S91.101D OPEN WOUND OF RIGHT GREAT TOE, SUBSEQUENT ENCOUNTER: Primary | ICD-10-CM

## 2024-12-20 PROCEDURE — 99213 OFFICE O/P EST LOW 20 MIN: CPT | Mod: 25

## 2024-12-20 PROCEDURE — 97597 DBRDMT OPN WND 1ST 20 CM/<: CPT

## 2024-12-20 PROCEDURE — 27000999 HC MEDICAL RECORD PHOTO DOCUMENTATION
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
## 2024-12-20 NOTE — PROCEDURES
"Debridement    Date/Time: 12/20/2024 11:20 AM    Performed by: Ron Chairez MD  Authorized by: Ron Chairez MD    Time out: Immediately prior to procedure a "time out" was called to verify the correct patient, procedure, equipment, support staff and site/side marked as required.    Consent Done?:  Yes (Verbal)    Preparation: Patient was prepped and draped with aseptic technique    Local anesthesia used?: No      Wound Details:    Location:  Right foot    Location:  Right 1st Toe    Type of Debridement:  Non-excisional       Length (cm):  2       Width (cm):  3       Depth (cm):  0.2       Area (sq cm):  6       Percent Debrided (%):  100       Total Area Debrided (sq cm):  6    Depth of debridement:  Epidermis/Dermis    Tissue debrided:  Epidermis    Devitalized tissue debrided:  Necrotic/Eschar    Instruments:  Forceps and Scissors  Bleeding:  None  Patient tolerance:  Patient tolerated the procedure well with no immediate complications  1st Wound Pain Assessment: 0    "

## 2025-01-03 ENCOUNTER — HOSPITAL ENCOUNTER (OUTPATIENT)
Dept: WOUND CARE | Facility: HOSPITAL | Age: 70
Discharge: HOME | End: 2025-01-03
Attending: FAMILY MEDICINE
Payer: MEDICARE

## 2025-01-03 VITALS
BODY MASS INDEX: 30.36 KG/M2 | SYSTOLIC BLOOD PRESSURE: 136 MMHG | DIASTOLIC BLOOD PRESSURE: 87 MMHG | RESPIRATION RATE: 19 BRPM | HEIGHT: 70 IN | WEIGHT: 212.06 LBS | HEART RATE: 76 BPM

## 2025-01-03 DIAGNOSIS — S91.101D OPEN WOUND OF RIGHT GREAT TOE, SUBSEQUENT ENCOUNTER: Primary | ICD-10-CM

## 2025-01-03 PROCEDURE — 27000999 HC MEDICAL RECORD PHOTO DOCUMENTATION

## 2025-01-03 PROCEDURE — 99212 OFFICE O/P EST SF 10 MIN: CPT

## 2025-01-03 RX ORDER — OXYCODONE AND ACETAMINOPHEN 10; 325 MG/1; MG/1
1 TABLET ORAL EVERY 6 HOURS PRN
COMMUNITY
Start: 2024-12-31

## 2025-01-03 RX ORDER — TESTOSTERONE CYPIONATE 200 MG/ML
INJECTION, SOLUTION INTRAMUSCULAR
COMMUNITY
Start: 2024-12-19

## 2025-01-03 RX ORDER — CLINDAMYCIN HYDROCHLORIDE 300 MG/1
300 CAPSULE ORAL EVERY 8 HOURS
COMMUNITY
Start: 2024-12-26
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
## 2025-03-03 DIAGNOSIS — Z87.891 PERSONAL HISTORY OF TOBACCO USE, PRESENTING HAZARDS TO HEALTH: Primary | ICD-10-CM

## 2025-03-18 ENCOUNTER — HOSPITAL ENCOUNTER (OUTPATIENT)
Dept: RADIOLOGY | Facility: HOSPITAL | Age: 70
Discharge: HOME | End: 2025-03-18
Attending: FAMILY MEDICINE
Payer: MEDICARE

## 2025-03-18 DIAGNOSIS — Z87.891 PERSONAL HISTORY OF TOBACCO USE, PRESENTING HAZARDS TO HEALTH: ICD-10-CM

## 2025-03-18 PROCEDURE — 71271 CT THORAX LUNG CANCER SCR C-: CPT | Mod: TC

## 2025-04-09 ENCOUNTER — HOSPITAL ENCOUNTER (OUTPATIENT)
Dept: WOUND CARE | Facility: HOSPITAL | Age: 70
Discharge: HOME | End: 2025-04-09
Attending: NURSE PRACTITIONER
Payer: MEDICARE

## 2025-04-09 VITALS
HEIGHT: 70 IN | DIASTOLIC BLOOD PRESSURE: 85 MMHG | HEART RATE: 76 BPM | RESPIRATION RATE: 19 BRPM | WEIGHT: 212.06 LBS | BODY MASS INDEX: 30.36 KG/M2 | SYSTOLIC BLOOD PRESSURE: 134 MMHG

## 2025-04-09 DIAGNOSIS — L97.512 NEUROPATHIC ULCER OF TOE, RIGHT, WITH FAT LAYER EXPOSED: Primary | ICD-10-CM

## 2025-04-09 PROCEDURE — 99213 OFFICE O/P EST LOW 20 MIN: CPT | Mod: 25

## 2025-04-09 PROCEDURE — 27000999 HC MEDICAL RECORD PHOTO DOCUMENTATION

## 2025-04-09 PROCEDURE — 97597 DBRDMT OPN WND 1ST 20 CM/<: CPT

## 2025-04-09 RX ORDER — POTASSIUM CHLORIDE 750 MG/1
10 TABLET, EXTENDED RELEASE ORAL EVERY OTHER DAY
COMMUNITY
Start: 2025-01-27

## 2025-04-09 RX ORDER — CIPROFLOXACIN 500 MG/1
500 TABLET ORAL 2 TIMES DAILY
Qty: 14 TABLET | Refills: 0 | Status: SHIPPED | OUTPATIENT
Start: 2025-04-09 | End: 2025-04-16

## 2025-04-09 RX ORDER — DOXYCYCLINE HYCLATE 100 MG
100 TABLET ORAL 2 TIMES DAILY
Qty: 14 TABLET | Refills: 0 | Status: SHIPPED | OUTPATIENT
Start: 2025-04-09 | End: 2025-04-16

## 2025-04-09 NOTE — PROGRESS NOTES
Home Health: NONE  Smoker  [] Yes  [x] No   Diabetic: NO  Doppler done in office? [x] Yes [] No   Date: 07/10/24     Right dorsalis: monophasic     Right posterior: monpophasic  Is patient eligible for HBO [] Yes [x] No   Start date:   Is the patient eligible for a graft, and/or currently grafting?  [] Yes [x] No    If so, which one/size?       Subjective:       Patient ID: Paulo Dickerson is a 69 y.o. male.    Chief Complaint: Pressure Ulcer (Right 1st toe - stage 2)    HPI    January 3, 2025:  69-year-old white male, who is here for follow up of the open wound to the right great toe.  Since he was here last time, the wound has evidence only healed.  There some excess skin, which may need to be removed.  Otherwise, there is no open wound.  He did not go to work, after all.  He has been taking care of the toe, and keeping it dry.    4/9/25 - Mr. Dickerson returns today for the neuropathic ulcer at the right great toe.  He denies being diabetic but does have spinal issues.  He reports that two weeks ago the wound began opening at the distal end of the toe, and that it has continued to worse.  Today it was malodorous.  A selective debridement was performed with removal of the large area of undermining.  The wound was cultured and PO Rx Cipro and Doxycycline were sent to his pharmacy.       Review of Systems   Constitutional: Negative.    Respiratory: Negative.     Cardiovascular: Negative.    Skin:         As documented in the HPI.   All other systems reviewed and are negative.        Objective:      Vitals:    04/09/25 1307   BP: 134/85   Pulse: 76   Resp: 19       Physical Exam  Vitals and nursing note reviewed.   Constitutional:       Appearance: Normal appearance.   Cardiovascular:      Rate and Rhythm: Normal rate.   Pulmonary:      Effort: Pulmonary effort is normal.   Skin:     General: Skin is warm and dry.      Comments: Right great toe ulcer   Neurological:      Mental Status: He is alert.            Wound 04/09/25  "1310 Diabetic Ulcer Right plantar Toe, first #1 (Active)   04/09/25 1310 Toe, first   Present on Original Admission: Y   Primary Wound Type: Diabetic ulc   Side: Right   Orientation: plantar   Wound Approximate Age at First Assessment (Weeks):    Wound Number: #1   Is this injury device related?: No   Incision Type:    Closure Method:    Wound Description (Comments):    Type:    Additional Comments:    Ankle-Brachial Index:    Pulses:    Removal Indication and Assessment:    Wound Outcome:    Dressing Appearance Moist drainage 04/09/25 1310   Drainage Amount Moderate 04/09/25 1310   Drainage Characteristics/Odor Purulent;Malodorous 04/09/25 1310   Appearance Tan;Slough 04/09/25 1310   Tissue loss description Full thickness 04/09/25 1310   Periwound Area Intact 04/09/25 1310   Wound Edges Open 04/09/25 1310   Wound Length (cm) 1.5 cm 04/09/25 1310   Wound Width (cm) 1.1 cm 04/09/25 1310   Wound Depth (cm) 0.3 cm 04/09/25 1310   Wound Volume (cm^3) 0.259 cm^3 04/09/25 1310   Wound Surface Area (cm^2) 1.3 cm^2 04/09/25 1310   Undermining (depth (cm)/location) 0.4cm from 3-7 o'clock 04/09/25 1310   Care Cleansed with:;Wound cleanser 04/09/25 1310   Dressing Applied;Other (comment) 04/09/25 1310   Dressing Change Due 04/10/25 04/09/25 1310      4/9/25      Right great toe       Post debridement   Mesalt, silver alginate, 4x4 gauze, 2" coban   CT    Assessment:         ICD-10-CM ICD-9-CM   1. Neuropathic ulcer of toe, right, with fat layer exposed  L97.512 707.15         Procedures:     Debridement     Date/Time: 4/9/2025 1:00 PM     Performed by: Nancy Noyola NP  Authorized by: Nancy Noyola NP    Time out: Immediately prior to procedure a "time out" was called to verify the correct patient, procedure, equipment, support staff and site/side marked as required.     Consent Done?:  Yes (Verbal)     Preparation: Patient was prepped and draped with clean technique    Local anesthesia used?: No       Wound " Details:    Location:  Right foot    Location:  Right 1st Toe    Type of Debridement:  Non-excisional       Length (cm):  1.5       Width (cm):  1.1       Depth (cm):  0.3       Area (sq cm):  1.3       Percent Debrided (%):  100       Total Area Debrided (sq cm):  1.3    Depth of debridement:  Epidermis/Dermis    Devitalized tissue debrided:  Biofilm, Callus, Exudate, Fibrin and Necrotic/Eschar    Instruments:  Forceps, Scissors and Curette (Dermal)  Bleeding:  None  Patient tolerance:  Patient tolerated the procedure well with no immediate complications       [] Yes [] No   I & D performed  [] Yes [] No   Excisional debridement performed  [x] Yes [] No   Selective debridement performed           [] Yes [] No   Mechanical debridement performed         [] Yes [] No  Silver nitrate applied                                     [] Yes [] No  Labs ordered this visit                                  [] Yes [] No   Imaging ordered this visit                           [] Yes [] No   Tissue pathology and/or culture taken         MEDICATIONS    Current Outpatient Medications:     albuterol-ipratropium (DUO-NEB) 2.5 mg-0.5 mg/3 mL nebulizer solution, Take 3 mLs by nebulization every 6 (six) hours as needed for Wheezing. Rescue, Disp: 75 mL, Rfl: 0    amLODIPine-benazepriL (LOTREL) 10-40 mg per capsule, amlodipine 10 mg-benazepril 40 mg capsule  TAKE 1 CAPSULE BY MOUTH EVERY DAY, Disp: , Rfl:     anastrozole (ARIMIDEX) 1 mg Tab, Take 1 mg by mouth every 7 days., Disp: , Rfl:     bisoprolol (ZEBETA) 10 MG tablet, Take 20 mg by mouth., Disp: , Rfl:     celecoxib (CELEBREX) 200 MG capsule, celecoxib 200 mg capsule  TAKE 1 CAPSULE BY MOUTH EVERY DAY TAKE WITH FOOD, Disp: , Rfl:     famotidine (PEPCID) 40 MG tablet, TAKE ONE TABLET BY MOUTH AT NIGHT, Disp: , Rfl:     fenofibrate (TRICOR) 48 MG tablet, fenofibrate nanocrystallized 48 mg tablet  TAKE 1 TABLET BY MOUTH EVERY DAY FOR TRIGLYCERIDES, Disp: , Rfl:     furosemide (LASIX) 20  MG tablet, Taking one every other day, Disp: , Rfl:     gabapentin (NEURONTIN) 600 MG tablet, gabapentin 600 mg tablet  TAKE ONE TABLET BY MOUTH THREE TIMES DAILY, Disp: , Rfl:     HYDROcodone-acetaminophen (NORCO)  mg per tablet, Take 1 tablet by mouth every evening., Disp: , Rfl:     omega-3 fatty acids/fish oil (FISH OIL-OMEGA-3 FATTY ACIDS) 300-1,000 mg capsule, Take 2 capsules by mouth once daily., Disp: , Rfl:     omeprazole (PRILOSEC) 40 MG capsule, TAKE 1 CAPSULE BY MOUTH ONCE DAILY 30 MINUTES BEFORE A MEAL, Disp: , Rfl:     oxyCODONE-acetaminophen (PERCOCET)  mg per tablet, Take 1 tablet by mouth every 6 (six) hours as needed., Disp: , Rfl:     potassium chloride (KLOR-CON) 10 MEQ TbSR, Take 10 mEq by mouth every other day., Disp: , Rfl:     primidone (MYSOLINE) 50 MG Tab, , Disp: , Rfl:     rosuvastatin (CRESTOR) 5 MG tablet, , Disp: , Rfl:     testosterone cypionate (DEPOTESTOTERONE CYPIONATE) 200 mg/mL injection, SMARTSI.7 Milliliter(s) IM Once a Week, Disp: , Rfl:     tiZANidine (ZANAFLEX) 4 MG tablet, TAKE TWO TABLETS BY MOUTH AT BEDTIME must last 90 DAYS, Disp: , Rfl:     traZODone (DESYREL) 100 MG tablet, trazodone 100 mg tablet  TAKE 1 TABLET BY MOUTH AT BEDTIME, Disp: , Rfl:     TRELEGY ELLIPTA 100-62.5-25 mcg DsDv, , Disp: , Rfl:     XARELTO 20 mg Tab, Take 20 mg by mouth., Disp: , Rfl:     ciprofloxacin HCl (CIPRO) 500 MG tablet, Take 1 tablet (500 mg total) by mouth 2 (two) times daily. for 7 days, Disp: 14 tablet, Rfl: 0    doxycycline (VIBRA-TABS) 100 MG tablet, Take 1 tablet (100 mg total) by mouth 2 (two) times daily. for 7 days, Disp: 14 tablet, Rfl: 0 Review of patient's allergies indicates:  No Known Allergies    DIAGNOSTICS    Labs/Scans/Micro:    CBC:   Lab Results   Component Value Date    WBC 13.82 (H) 2024    HGB 11.3 (L) 2024    HCT 36.7 (L) 2024    MCV 85.2 2024     2024         HOME HEALTH AGENCY: NONE  WOUND CARE ORDERS:  Right  "great toe wound: Cleanse with wound cleanser, apply mesalt, silver alginate, 4x4 gauze, 2" coban to be changed daily         Follow up in 1 week (on 4/16/2025) for right 1st toe .       "

## 2025-04-09 NOTE — PROCEDURES
"Debridement    Date/Time: 4/9/2025 1:00 PM    Performed by: Nancy Noyola NP  Authorized by: Nancy Noyola NP    Time out: Immediately prior to procedure a "time out" was called to verify the correct patient, procedure, equipment, support staff and site/side marked as required.    Consent Done?:  Yes (Verbal)    Preparation: Patient was prepped and draped with clean technique    Local anesthesia used?: No      Wound Details:    Location:  Right foot    Location:  Right 1st Toe    Type of Debridement:  Non-excisional       Length (cm):  1.5       Width (cm):  1.1       Depth (cm):  0.3       Area (sq cm):  1.3       Percent Debrided (%):  100       Total Area Debrided (sq cm):  1.3    Depth of debridement:  Epidermis/Dermis    Devitalized tissue debrided:  Biofilm, Callus, Exudate, Fibrin and Necrotic/Eschar    Instruments:  Forceps, Scissors and Curette (Dermal)  Bleeding:  None  Patient tolerance:  Patient tolerated the procedure well with no immediate complications    "

## 2025-04-16 ENCOUNTER — HOSPITAL ENCOUNTER (OUTPATIENT)
Dept: WOUND CARE | Facility: HOSPITAL | Age: 70
Discharge: HOME | End: 2025-04-16
Attending: NURSE PRACTITIONER
Payer: MEDICARE

## 2025-04-16 VITALS
BODY MASS INDEX: 30.36 KG/M2 | RESPIRATION RATE: 18 BRPM | DIASTOLIC BLOOD PRESSURE: 85 MMHG | WEIGHT: 212.06 LBS | HEIGHT: 70 IN | SYSTOLIC BLOOD PRESSURE: 142 MMHG | HEART RATE: 87 BPM

## 2025-04-16 DIAGNOSIS — L97.512 NEUROPATHIC ULCER OF TOE, RIGHT, WITH FAT LAYER EXPOSED: Primary | ICD-10-CM

## 2025-04-16 DIAGNOSIS — S91.101D OPEN WOUND OF RIGHT GREAT TOE, SUBSEQUENT ENCOUNTER: ICD-10-CM

## 2025-04-16 PROCEDURE — 99213 OFFICE O/P EST LOW 20 MIN: CPT

## 2025-04-16 PROCEDURE — 27000999 HC MEDICAL RECORD PHOTO DOCUMENTATION

## 2025-04-16 NOTE — PROGRESS NOTES
Home Health: NONE  Smoker  [] Yes  [x] No   Diabetic: NO  Doppler done in office? [x] Yes [] No   Date: 07/10/24     Right dorsalis: monophasic     Right posterior: monpophasic  Is patient eligible for HBO [] Yes [x] No   Start date:   Is the patient eligible for a graft, and/or currently grafting?  [] Yes [x] No    If so, which one/size?  Darco shoe with PEG assist given 4/16/25, size large      Subjective:       Patient ID: Paulo Dickerson is a 69 y.o. male.    Chief Complaint: Pressure Ulcer ( (Right 1st toe - stage 2))    HPI    January 3, 2025:  69-year-old white male, who is here for follow up of the open wound to the right great toe.  Since he was here last time, the wound has evidence only healed.  There some excess skin, which may need to be removed.  Otherwise, there is no open wound.  He did not go to work, after all.  He has been taking care of the toe, and keeping it dry.    4/9/25 - Mr. Dickerson returns today for the neuropathic ulcer at the right great toe.  He denies being diabetic but does have spinal issues.  He reports that two weeks ago the wound began opening at the distal end of the toe, and that it has continued to worse.  Today it was malodorous.  A selective debridement was performed with removal of the large area of undermining.  The wound was cultured and PO Rx Cipro and Doxycycline were sent to his pharmacy.     4/16/25 - The patient returns today for f/up on the right great toe ulcer.  He was not aware that he had Rx at the pharmacy, so has not begun abx.  Today we reviewed his culture results.  The wound has improved, surprisingly.  He declined the Rx x 7 days of Metronidazole due to alcohol intake.  He will  the Doxy and Cipro today and begin those abx.  We will reassess next for the need to discuss further Metronidazole.         Review of Systems   Constitutional: Negative.    Respiratory: Negative.     Cardiovascular: Negative.    Skin:         As documented in the HPI.   All other  "systems reviewed and are negative.        Objective:      Vitals:    04/16/25 1403   BP: (!) 142/85   Pulse: 87   Resp: 18       Physical Exam  Vitals and nursing note reviewed.   Constitutional:       Appearance: Normal appearance.   Cardiovascular:      Rate and Rhythm: Normal rate.   Pulmonary:      Effort: Pulmonary effort is normal.   Skin:     General: Skin is warm and dry.      Comments: Right great toe ulcer   Neurological:      Mental Status: He is alert.            Wound 04/09/25 1310 Diabetic Ulcer Right plantar Toe, first #1 (Active)   04/09/25 1310 Toe, first   Present on Original Admission: Y   Primary Wound Type: Diabetic ulc   Side: Right   Orientation: plantar   Wound Approximate Age at First Assessment (Weeks):    Wound Number: #1   Is this injury device related?: No   Incision Type:    Closure Method:    Wound Description (Comments):    Type:    Additional Comments:    Ankle-Brachial Index:    Pulses:    Removal Indication and Assessment:    Wound Outcome:    Dressing Appearance Moist drainage 04/16/25 1403   Drainage Amount Moderate 04/16/25 1403   Drainage Characteristics/Odor Serosanguineous;Malodorous 04/16/25 1403   Appearance Red;Moist 04/16/25 1403   Tissue loss description Full thickness 04/16/25 1403   Periwound Area Moist;Pale white 04/16/25 1403   Wound Edges Callused;Open 04/16/25 1403   Wound Length (cm) 2.3 cm 04/16/25 1403   Wound Width (cm) 2 cm 04/16/25 1403   Wound Depth (cm) 0.3 cm 04/16/25 1403   Wound Volume (cm^3) 0.723 cm^3 04/16/25 1403   Wound Surface Area (cm^2) 3.61 cm^2 04/16/25 1403   Care Cleansed with:;Wound cleanser 04/16/25 1403   Dressing Applied 04/16/25 1403      4/9/25      Right great toe       Post debridement   Mesalt, silver alginate, 4x4 gauze, 2" coban     4/16/25    Right 1st toe (pre)  (Silver alginate, 4x4, 2" kerlix, coban)    Assessment:       No diagnosis found.        Procedures:     Mechanical debridement only    [] Yes [] No   I & D performed  [] " Yes [] No   Excisional debridement performed  [] Yes [] No   Selective debridement performed           [x] Yes [] No   Mechanical debridement performed         [] Yes [] No  Silver nitrate applied                                     [] Yes [] No  Labs ordered this visit                                  [] Yes [] No   Imaging ordered this visit                           [] Yes [] No   Tissue pathology and/or culture taken         MEDICATIONS    Current Outpatient Medications:     albuterol-ipratropium (DUO-NEB) 2.5 mg-0.5 mg/3 mL nebulizer solution, Take 3 mLs by nebulization every 6 (six) hours as needed for Wheezing. Rescue, Disp: 75 mL, Rfl: 0    amLODIPine-benazepriL (LOTREL) 10-40 mg per capsule, amlodipine 10 mg-benazepril 40 mg capsule  TAKE 1 CAPSULE BY MOUTH EVERY DAY, Disp: , Rfl:     anastrozole (ARIMIDEX) 1 mg Tab, Take 1 mg by mouth every 7 days., Disp: , Rfl:     bisoprolol (ZEBETA) 10 MG tablet, Take 20 mg by mouth., Disp: , Rfl:     celecoxib (CELEBREX) 200 MG capsule, celecoxib 200 mg capsule  TAKE 1 CAPSULE BY MOUTH EVERY DAY TAKE WITH FOOD, Disp: , Rfl:     famotidine (PEPCID) 40 MG tablet, TAKE ONE TABLET BY MOUTH AT NIGHT, Disp: , Rfl:     fenofibrate (TRICOR) 48 MG tablet, fenofibrate nanocrystallized 48 mg tablet  TAKE 1 TABLET BY MOUTH EVERY DAY FOR TRIGLYCERIDES, Disp: , Rfl:     furosemide (LASIX) 20 MG tablet, Taking one every other day, Disp: , Rfl:     gabapentin (NEURONTIN) 600 MG tablet, gabapentin 600 mg tablet  TAKE ONE TABLET BY MOUTH THREE TIMES DAILY, Disp: , Rfl:     HYDROcodone-acetaminophen (NORCO)  mg per tablet, Take 1 tablet by mouth every evening., Disp: , Rfl:     omega-3 fatty acids/fish oil (FISH OIL-OMEGA-3 FATTY ACIDS) 300-1,000 mg capsule, Take 2 capsules by mouth once daily., Disp: , Rfl:     omeprazole (PRILOSEC) 40 MG capsule, TAKE 1 CAPSULE BY MOUTH ONCE DAILY 30 MINUTES BEFORE A MEAL, Disp: , Rfl:     oxyCODONE-acetaminophen (PERCOCET)  mg per tablet,  "Take 1 tablet by mouth every 6 (six) hours as needed., Disp: , Rfl:     potassium chloride (KLOR-CON) 10 MEQ TbSR, Take 10 mEq by mouth every other day., Disp: , Rfl:     primidone (MYSOLINE) 50 MG Tab, , Disp: , Rfl:     rosuvastatin (CRESTOR) 5 MG tablet, , Disp: , Rfl:     testosterone cypionate (DEPOTESTOTERONE CYPIONATE) 200 mg/mL injection, SMARTSI.7 Milliliter(s) IM Once a Week, Disp: , Rfl:     tiZANidine (ZANAFLEX) 4 MG tablet, TAKE TWO TABLETS BY MOUTH AT BEDTIME must last 90 DAYS, Disp: , Rfl:     traZODone (DESYREL) 100 MG tablet, trazodone 100 mg tablet  TAKE 1 TABLET BY MOUTH AT BEDTIME, Disp: , Rfl:     TRELEGY ELLIPTA 100-62.5-25 mcg DsDv, , Disp: , Rfl:     XARELTO 20 mg Tab, Take 20 mg by mouth., Disp: , Rfl:     ciprofloxacin HCl (CIPRO) 500 MG tablet, Take 1 tablet (500 mg total) by mouth 2 (two) times daily. for 7 days (Patient not taking: Reported on 2025), Disp: 14 tablet, Rfl: 0    doxycycline (VIBRA-TABS) 100 MG tablet, Take 1 tablet (100 mg total) by mouth 2 (two) times daily. for 7 days (Patient not taking: Reported on 2025), Disp: 14 tablet, Rfl: 0 Review of patient's allergies indicates:  No Known Allergies    DIAGNOSTICS    Labs/Scans/Micro:    CBC:   Lab Results   Component Value Date    WBC 13.82 (H) 2024    HGB 11.3 (L) 2024    HCT 36.7 (L) 2024    MCV 85.2 2024     2024 -         HOME HEALTH AGENCY: NONE  WOUND CARE ORDERS:  Right great toe wound: Cleanse with wound cleanser, apply silver alginate, 4x4 gauze, 2" coban to be changed daily         Follow up in 1 week (on 2025) for right toe.       "

## 2025-04-24 ENCOUNTER — HOSPITAL ENCOUNTER (OUTPATIENT)
Dept: WOUND CARE | Facility: HOSPITAL | Age: 70
Discharge: HOME | End: 2025-04-24
Attending: NURSE PRACTITIONER
Payer: MEDICARE

## 2025-04-24 VITALS
BODY MASS INDEX: 30.36 KG/M2 | RESPIRATION RATE: 19 BRPM | SYSTOLIC BLOOD PRESSURE: 129 MMHG | DIASTOLIC BLOOD PRESSURE: 84 MMHG | HEART RATE: 85 BPM | HEIGHT: 70 IN | WEIGHT: 212.06 LBS

## 2025-04-24 DIAGNOSIS — S91.101D OPEN WOUND OF RIGHT GREAT TOE, SUBSEQUENT ENCOUNTER: ICD-10-CM

## 2025-04-24 DIAGNOSIS — L97.512 NEUROPATHIC ULCER OF TOE, RIGHT, WITH FAT LAYER EXPOSED: Primary | ICD-10-CM

## 2025-04-24 PROCEDURE — 99212 OFFICE O/P EST SF 10 MIN: CPT | Mod: 25

## 2025-04-24 PROCEDURE — 27000999 HC MEDICAL RECORD PHOTO DOCUMENTATION

## 2025-04-24 PROCEDURE — 97597 DBRDMT OPN WND 1ST 20 CM/<: CPT

## 2025-04-24 NOTE — PROCEDURES
"Debridement    Date/Time: 4/24/2025 10:20 AM    Performed by: Nancy Noyola NP  Authorized by: Nancy Noyola NP    Time out: Immediately prior to procedure a "time out" was called to verify the correct patient, procedure, equipment, support staff and site/side marked as required.    Consent Done?:  Yes (Verbal)    Preparation: Patient was prepped and draped with clean technique    Local anesthesia used?: No      Wound Details:    Location:  Right foot    Location:  Right 1st Toe    Type of Debridement:  Non-excisional       Length (cm):  1.6       Width (cm):  1       Depth (cm):  0.3       Area (sq cm):  1.26       Percent Debrided (%):  100       Total Area Debrided (sq cm):  1.26    Depth of debridement:  Epidermis/Dermis    Devitalized tissue debrided:  Biofilm, Callus, Exudate, Fibrin and Slough    Instruments:  Curette (Dermal)  Bleeding:  None  Patient tolerance:  Patient tolerated the procedure well with no immediate complications    "

## 2025-04-24 NOTE — PROGRESS NOTES
Home Health: NONE  Smoker  [] Yes  [x] No   Diabetic: NO  Doppler done in office? [x] Yes [] No   Date: 07/10/24     Right dorsalis: monophasic     Right posterior: monpophasic  Is patient eligible for HBO [] Yes [x] No   Start date:   Is the patient eligible for a graft, and/or currently grafting?  [] Yes [x] No    If so, which one/size?  Darco shoe with PEG assist given 4/16/25, size large        Subjective:       Patient ID: Paulo Dickerson is a 69 y.o. male.    Chief Complaint: Pressure Ulcer (Right 1st toe - stage 2)    HPI    January 3, 2025:  69-year-old white male, who is here for follow up of the open wound to the right great toe.  Since he was here last time, the wound has evidence only healed.  There some excess skin, which may need to be removed.  Otherwise, there is no open wound.  He did not go to work, after all.  He has been taking care of the toe, and keeping it dry.    4/9/25 - Mr. Dickerson returns today for the neuropathic ulcer at the right great toe.  He denies being diabetic but does have spinal issues.  He reports that two weeks ago the wound began opening at the distal end of the toe, and that it has continued to worse.  Today it was malodorous.  A selective debridement was performed with removal of the large area of undermining.  The wound was cultured and PO Rx Cipro and Doxycycline were sent to his pharmacy.     4/16/25 - The patient returns today for f/up on the right great toe ulcer.  He was not aware that he had Rx at the pharmacy, so has not begun abx.  Today we reviewed his culture results.  The wound has improved, surprisingly.  He declined the Rx x 7 days of Metronidazole due to alcohol intake.  He will  the Doxy and Cipro today and begin those abx.  We will reassess next for the need to discuss further Metronidazole.         4/24/25 - Mr. Dickerson is seen today for f/up on the right great toe neuropathic ulcer.  He has continue to take his PO abx (Cipro/Doxy) and has one day  remaining.  Today the wound is significantly improved, however, there is now a thick layer of slough across the wound bed.  He was unable to tolerate removal. We will begin using Mesalt on the wound.  The left great toe was assessed and a callus paring was performed.  There is no open wound at this time.       Review of Systems   Constitutional: Negative.    Respiratory: Negative.     Cardiovascular: Negative.    Skin:         As documented in the HPI.   All other systems reviewed and are negative.        Objective:      Vitals:    04/24/25 1048   BP: 129/84   Pulse: 85   Resp: 19       Physical Exam  Vitals and nursing note reviewed.   Constitutional:       Appearance: Normal appearance.   Cardiovascular:      Rate and Rhythm: Normal rate.   Pulmonary:      Effort: Pulmonary effort is normal.   Skin:     General: Skin is warm and dry.      Comments: Right great toe ulcer   Neurological:      Mental Status: He is alert.            Wound 04/09/25 1310 Diabetic Ulcer Right plantar Toe, first #1 (Active)   04/09/25 1310 Toe, first   Present on Original Admission: Y   Primary Wound Type: Diabetic ulc   Side: Right   Orientation: plantar   Wound Approximate Age at First Assessment (Weeks):    Wound Number: #1   Is this injury device related?: No   Incision Type:    Closure Method:    Wound Description (Comments):    Type:    Additional Comments:    Ankle-Brachial Index:    Pulses:    Removal Indication and Assessment:    Wound Outcome:    Dressing Appearance Moist drainage 04/24/25 1049   Drainage Amount Moderate 04/24/25 1049   Drainage Characteristics/Odor Serosanguineous 04/24/25 1049   Appearance Tan;Moist;Slough 04/24/25 1049   Tissue loss description Full thickness 04/24/25 1049   Periwound Area Intact 04/24/25 1049   Wound Edges Callused 04/24/25 1049   Wound Length (cm) 1.6 cm 04/24/25 1049   Wound Width (cm) 1 cm 04/24/25 1049   Wound Depth (cm) 0.3 cm 04/24/25 1049   Wound Volume (cm^3) 0.251 cm^3 04/24/25 1049  "  Wound Surface Area (cm^2) 1.26 cm^2 04/24/25 1049   Care Cleansed with:;Wound cleanser 04/24/25 1049   Dressing Applied;Other (comment) 04/24/25 1049   Dressing Change Due 04/25/25 04/24/25 1049      4/24/25      Right great toe        Post debridement   mesalt, 4x4 gauze, 2" coban         Left great toe          Post callus pairing   CT      Assessment:         ICD-10-CM ICD-9-CM   1. Neuropathic ulcer of toe, right, with fat layer exposed  L97.512 707.15   2. Open wound of right great toe, subsequent encounter  S91.101D V58.89     893.0           Procedures:     Debridement     Date/Time: 4/24/2025 10:20 AM     Performed by: Nancy Noyola NP  Authorized by: Nancy Noyola NP    Time out: Immediately prior to procedure a "time out" was called to verify the correct patient, procedure, equipment, support staff and site/side marked as required.     Consent Done?:  Yes (Verbal)     Preparation: Patient was prepped and draped with clean technique    Local anesthesia used?: No       Wound Details:    Location:  Right foot    Location:  Right 1st Toe    Type of Debridement:  Non-excisional       Length (cm):  1.6       Width (cm):  1       Depth (cm):  0.3       Area (sq cm):  1.26       Percent Debrided (%):  100       Total Area Debrided (sq cm):  1.26    Depth of debridement:  Epidermis/Dermis    Devitalized tissue debrided:  Biofilm, Callus, Exudate, Fibrin and Slough    Instruments:  Curette (Dermal)  Bleeding:  None  Patient tolerance:  Patient tolerated the procedure well with no immediate complications    [] Yes [] No   I & D performed  [] Yes [] No   Excisional debridement performed  [x] Yes [] No   Selective debridement performed           [] Yes [] No   Mechanical debridement performed         [] Yes [] No  Silver nitrate applied                                     [] Yes [] No  Labs ordered this visit                                  [] Yes [] No   Imaging ordered this visit                        "    [] Yes [] No   Tissue pathology and/or culture taken         MEDICATIONS    Current Outpatient Medications:     albuterol-ipratropium (DUO-NEB) 2.5 mg-0.5 mg/3 mL nebulizer solution, Take 3 mLs by nebulization every 6 (six) hours as needed for Wheezing. Rescue, Disp: 75 mL, Rfl: 0    amLODIPine-benazepriL (LOTREL) 10-40 mg per capsule, amlodipine 10 mg-benazepril 40 mg capsule  TAKE 1 CAPSULE BY MOUTH EVERY DAY, Disp: , Rfl:     anastrozole (ARIMIDEX) 1 mg Tab, Take 1 mg by mouth every 7 days., Disp: , Rfl:     bisoprolol (ZEBETA) 10 MG tablet, Take 20 mg by mouth., Disp: , Rfl:     celecoxib (CELEBREX) 200 MG capsule, celecoxib 200 mg capsule  TAKE 1 CAPSULE BY MOUTH EVERY DAY TAKE WITH FOOD, Disp: , Rfl:     famotidine (PEPCID) 40 MG tablet, TAKE ONE TABLET BY MOUTH AT NIGHT, Disp: , Rfl:     fenofibrate (TRICOR) 48 MG tablet, fenofibrate nanocrystallized 48 mg tablet  TAKE 1 TABLET BY MOUTH EVERY DAY FOR TRIGLYCERIDES, Disp: , Rfl:     furosemide (LASIX) 20 MG tablet, Taking one every other day, Disp: , Rfl:     gabapentin (NEURONTIN) 600 MG tablet, gabapentin 600 mg tablet  TAKE ONE TABLET BY MOUTH THREE TIMES DAILY, Disp: , Rfl:     HYDROcodone-acetaminophen (NORCO)  mg per tablet, Take 1 tablet by mouth every evening., Disp: , Rfl:     omega-3 fatty acids/fish oil (FISH OIL-OMEGA-3 FATTY ACIDS) 300-1,000 mg capsule, Take 2 capsules by mouth once daily., Disp: , Rfl:     omeprazole (PRILOSEC) 40 MG capsule, TAKE 1 CAPSULE BY MOUTH ONCE DAILY 30 MINUTES BEFORE A MEAL, Disp: , Rfl:     oxyCODONE-acetaminophen (PERCOCET)  mg per tablet, Take 1 tablet by mouth every 6 (six) hours as needed., Disp: , Rfl:     potassium chloride (KLOR-CON) 10 MEQ TbSR, Take 10 mEq by mouth every other day., Disp: , Rfl:     primidone (MYSOLINE) 50 MG Tab, , Disp: , Rfl:     rosuvastatin (CRESTOR) 5 MG tablet, , Disp: , Rfl:     testosterone cypionate (DEPOTESTOTERONE CYPIONATE) 200 mg/mL injection, SMARTSI.7  "Milliliter(s) IM Once a Week, Disp: , Rfl:     tiZANidine (ZANAFLEX) 4 MG tablet, TAKE TWO TABLETS BY MOUTH AT BEDTIME must last 90 DAYS, Disp: , Rfl:     traZODone (DESYREL) 100 MG tablet, trazodone 100 mg tablet  TAKE 1 TABLET BY MOUTH AT BEDTIME, Disp: , Rfl:     TRELEGY ELLIPTA 100-62.5-25 mcg DsDv, , Disp: , Rfl:     XARELTO 20 mg Tab, Take 20 mg by mouth., Disp: , Rfl:  Review of patient's allergies indicates:  No Known Allergies    DIAGNOSTICS    Labs/Scans/Micro:    CBC:   Lab Results   Component Value Date    WBC 13.82 (H) 12/09/2024    HGB 11.3 (L) 12/09/2024    HCT 36.7 (L) 12/09/2024    MCV 85.2 12/09/2024     12/09/2024 4/9/25 -         HOME HEALTH AGENCY: NONE  WOUND CARE ORDERS:  Right great toe wound: Cleanse with wound cleanser, apply mesalt, 4x4 gauze, 2" coban to be changed daily         Follow up in 1 week (on 5/1/2025) for right toe .       "

## 2025-04-30 ENCOUNTER — HOSPITAL ENCOUNTER (OUTPATIENT)
Dept: WOUND CARE | Facility: HOSPITAL | Age: 70
Discharge: HOME | End: 2025-04-30
Attending: FAMILY MEDICINE
Payer: MEDICARE

## 2025-04-30 VITALS
SYSTOLIC BLOOD PRESSURE: 152 MMHG | WEIGHT: 212.06 LBS | RESPIRATION RATE: 18 BRPM | BODY MASS INDEX: 30.36 KG/M2 | HEIGHT: 70 IN | HEART RATE: 83 BPM | DIASTOLIC BLOOD PRESSURE: 98 MMHG

## 2025-04-30 DIAGNOSIS — L97.512 NEUROPATHIC ULCER OF TOE, RIGHT, WITH FAT LAYER EXPOSED: Primary | ICD-10-CM

## 2025-04-30 DIAGNOSIS — S91.101D OPEN WOUND OF RIGHT GREAT TOE, SUBSEQUENT ENCOUNTER: ICD-10-CM

## 2025-04-30 PROCEDURE — 97597 DBRDMT OPN WND 1ST 20 CM/<: CPT

## 2025-04-30 PROCEDURE — 99213 OFFICE O/P EST LOW 20 MIN: CPT | Mod: 25

## 2025-04-30 PROCEDURE — 27000999 HC MEDICAL RECORD PHOTO DOCUMENTATION

## 2025-04-30 NOTE — PROCEDURES
"Debridement    Date/Time: 4/30/2025 1:00 PM    Performed by: Ron Chairez MD  Authorized by: Ron Chairez MD    Time out: Immediately prior to procedure a "time out" was called to verify the correct patient, procedure, equipment, support staff and site/side marked as required.    Consent Done?:  Yes (Verbal)    Preparation: Patient was prepped and draped with aseptic technique    Local anesthesia used?: No      Wound Details:    Location:  Right foot    Location:  Right 1st Toe    Type of Debridement:  Non-excisional       Length (cm):  1.4       Width (cm):  1       Depth (cm):  0.2       Area (sq cm):  1.1       Percent Debrided (%):  50       Total Area Debrided (sq cm):  0.55    Depth of debridement:  Epidermis/Dermis    Tissue debrided:  Epidermis    Devitalized tissue debrided:  Biofilm, Exudate and Slough    Instruments:  Curette (freer)  Bleeding:  None  Patient tolerance:  Patient tolerated the procedure well with no immediate complications  1st Wound Pain Assessment: 2    "

## 2025-04-30 NOTE — PROGRESS NOTES
Home Health: NONE  Smoker  [] Yes  [x] No   Diabetic: NO  Doppler done in office? [x] Yes [] No   Date: 07/10/24     Right dorsalis: monophasic     Right posterior: monpophasic  Is patient eligible for HBO [] Yes [x] No   Start date:   Is the patient eligible for a graft, and/or currently grafting?  [] Yes [x] No    If so, which one/size?  Darco shoe with PEG assist given 4/16/25, size large    NOTES:  Has finished with Cipro and Doxy    Subjective:       Patient ID: Paulo Dickerson is a 69 y.o. male.    Chief Complaint: Pressure Ulcer ((Right 1st toe - stage 2))    HPI    January 3, 2025:  69-year-old white male, who is here for follow up of the open wound to the right great toe.  Since he was here last time, the wound has evidence only healed.  There some excess skin, which may need to be removed.  Otherwise, there is no open wound.  He did not go to work, after all.  He has been taking care of the toe, and keeping it dry.    4/9/25 - Mr. Dickerson returns today for the neuropathic ulcer at the right great toe.  He denies being diabetic but does have spinal issues.  He reports that two weeks ago the wound began opening at the distal end of the toe, and that it has continued to worse.  Today it was malodorous.  A selective debridement was performed with removal of the large area of undermining.  The wound was cultured and PO Rx Cipro and Doxycycline were sent to his pharmacy.     4/16/25 - The patient returns today for f/up on the right great toe ulcer.  He was not aware that he had Rx at the pharmacy, so has not begun abx.  Today we reviewed his culture results.  The wound has improved, surprisingly.  He declined the Rx x 7 days of Metronidazole due to alcohol intake.  He will  the Doxy and Cipro today and begin those abx.  We will reassess next for the need to discuss further Metronidazole.         4/24/25 - Mr. Dickerson is seen today for f/up on the right great toe neuropathic ulcer.  He has continue to take his PO  abx (Cipro/Doxy) and has one day remaining.  Today the wound is significantly improved, however, there is now a thick layer of slough across the wound bed.  He was unable to tolerate removal. We will begin using Mesalt on the wound.  The left great toe was assessed and a callus paring was performed.  There is no open wound at this time.     April 30, 2025:  69-year-old white male, who has recurrence of a distal right great toe ulceration.  It has been classified as a neuropathic ulcer.  He has tried using at least 3-4 appears of different shoes.  It does not appear related to pressure.  His arterial circulation was evaluated and found to have no compromise.  He has been using Mesalt.  Measurements are slightly improved since last week.    Review of Systems   Constitutional: Negative.    Respiratory: Negative.     Cardiovascular: Negative.    Skin:         As documented in the HPI.   All other systems reviewed and are negative.        Objective:      Vitals:    04/30/25 1313   BP: (!) 152/98   Pulse: 83   Resp: 18       Physical Exam  Vitals and nursing note reviewed.   Constitutional:       Appearance: Normal appearance.   Cardiovascular:      Rate and Rhythm: Normal rate.   Pulmonary:      Effort: Pulmonary effort is normal.   Skin:     General: Skin is warm and dry.      Comments: The distal end of the right great toe reveal some biofilm and exudate within the ulcer bed, along with some slough.  I tried to remove as much as I could, but the ulcer is painful.   Neurological:      Mental Status: He is alert.            Wound 04/09/25 1310 Diabetic Ulcer Right plantar Toe, first #1 (Active)   04/09/25 1310 Toe, first   Present on Original Admission: Y   Primary Wound Type: Diabetic ulc   Side: Right   Orientation: plantar   Wound Approximate Age at First Assessment (Weeks):    Wound Number: #1   Is this injury device related?: No   Incision Type:    Closure Method:    Wound Description (Comments):    Type:   "  Additional Comments:    Ankle-Brachial Index:    Pulses:    Removal Indication and Assessment:    Wound Outcome:    Dressing Appearance Moist drainage 04/30/25 1314   Drainage Amount Moderate 04/30/25 1314   Drainage Characteristics/Odor Serosanguineous 04/30/25 1314   Appearance Red;Yellow;Slough;Moist 04/30/25 1314   Tissue loss description Full thickness 04/30/25 1314   Periwound Area Dry 04/30/25 1314   Wound Edges Callused;Open 04/30/25 1314   Wound Length (cm) 1.4 cm 04/30/25 1314   Wound Width (cm) 1 cm 04/30/25 1314   Wound Depth (cm) 0.2 cm 04/30/25 1314   Wound Volume (cm^3) 0.147 cm^3 04/30/25 1314   Wound Surface Area (cm^2) 1.1 cm^2 04/30/25 1314   Care Cleansed with:;Wound cleanser 04/30/25 1314   Dressing Applied 04/30/25 1314      Debridement     Date/Time: 4/30/2025 1:00 PM     Performed by: Ron Chairez MD  Authorized by: Ron Chairez MD    Time out: Immediately prior to procedure a "time out" was called to verify the correct patient, procedure, equipment, support staff and site/side marked as required.     Consent Done?:  Yes (Verbal)     Preparation: Patient was prepped and draped with aseptic technique    Local anesthesia used?: No       Wound Details:    Location:  Right foot    Location:  Right 1st Toe    Type of Debridement:  Non-excisional       Length (cm):  1.4       Width (cm):  1       Depth (cm):  0.2       Area (sq cm):  1.1       Percent Debrided (%):  50       Total Area Debrided (sq cm):  0.55    Depth of debridement:  Epidermis/Dermis    Tissue debrided:  Epidermis    Devitalized tissue debrided:  Biofilm, Exudate and Slough    Instruments:  Curette (freer)  Bleeding:  None  Patient tolerance:  Patient tolerated the procedure well with no immediate complications  1st Wound Pain Assessment: 2     4/24/25      Right great toe        Post debridement   mesalt, 4x4 gauze, 2" coban         Left great toe          Post callus pairing     4/30/25       Right 1st toe (pre)    "                                            Left 1st toe (pre, monitoring)  (Mesalt, 4x4, mepilex, coban)      Right 1st toe (post)      Assessment:         ICD-10-CM ICD-9-CM   1. Neuropathic ulcer of toe, right, with fat layer exposed  L97.512 707.15   2. Open wound of right great toe, subsequent encounter  S91.101D V58.89     893.0             Procedures:     Selective - freer    [] Yes [] No   I & D performed  [] Yes [] No   Excisional debridement performed  [x] Yes [] No   Selective debridement performed           [] Yes [] No   Mechanical debridement performed         [] Yes [] No  Silver nitrate applied                                     [] Yes [] No  Labs ordered this visit                                  [] Yes [] No   Imaging ordered this visit                           [] Yes [] No   Tissue pathology and/or culture taken         MEDICATIONS    Current Outpatient Medications:     albuterol-ipratropium (DUO-NEB) 2.5 mg-0.5 mg/3 mL nebulizer solution, Take 3 mLs by nebulization every 6 (six) hours as needed for Wheezing. Rescue, Disp: 75 mL, Rfl: 0    amLODIPine-benazepriL (LOTREL) 10-40 mg per capsule, amlodipine 10 mg-benazepril 40 mg capsule  TAKE 1 CAPSULE BY MOUTH EVERY DAY, Disp: , Rfl:     anastrozole (ARIMIDEX) 1 mg Tab, Take 1 mg by mouth every 7 days., Disp: , Rfl:     bisoprolol (ZEBETA) 10 MG tablet, Take 20 mg by mouth., Disp: , Rfl:     celecoxib (CELEBREX) 200 MG capsule, celecoxib 200 mg capsule  TAKE 1 CAPSULE BY MOUTH EVERY DAY TAKE WITH FOOD, Disp: , Rfl:     famotidine (PEPCID) 40 MG tablet, TAKE ONE TABLET BY MOUTH AT NIGHT, Disp: , Rfl:     fenofibrate (TRICOR) 48 MG tablet, fenofibrate nanocrystallized 48 mg tablet  TAKE 1 TABLET BY MOUTH EVERY DAY FOR TRIGLYCERIDES, Disp: , Rfl:     furosemide (LASIX) 20 MG tablet, Taking one every other day, Disp: , Rfl:     gabapentin (NEURONTIN) 600 MG tablet, gabapentin 600 mg tablet  TAKE ONE TABLET BY MOUTH THREE TIMES DAILY, Disp: , Rfl:      "HYDROcodone-acetaminophen (NORCO)  mg per tablet, Take 1 tablet by mouth every evening., Disp: , Rfl:     omega-3 fatty acids/fish oil (FISH OIL-OMEGA-3 FATTY ACIDS) 300-1,000 mg capsule, Take 2 capsules by mouth once daily., Disp: , Rfl:     omeprazole (PRILOSEC) 40 MG capsule, TAKE 1 CAPSULE BY MOUTH ONCE DAILY 30 MINUTES BEFORE A MEAL, Disp: , Rfl:     oxyCODONE-acetaminophen (PERCOCET)  mg per tablet, Take 1 tablet by mouth every 6 (six) hours as needed., Disp: , Rfl:     potassium chloride (KLOR-CON) 10 MEQ TbSR, Take 10 mEq by mouth every other day., Disp: , Rfl:     primidone (MYSOLINE) 50 MG Tab, , Disp: , Rfl:     rosuvastatin (CRESTOR) 5 MG tablet, , Disp: , Rfl:     testosterone cypionate (DEPOTESTOTERONE CYPIONATE) 200 mg/mL injection, SMARTSI.7 Milliliter(s) IM Once a Week, Disp: , Rfl:     tiZANidine (ZANAFLEX) 4 MG tablet, TAKE TWO TABLETS BY MOUTH AT BEDTIME must last 90 DAYS, Disp: , Rfl:     traZODone (DESYREL) 100 MG tablet, trazodone 100 mg tablet  TAKE 1 TABLET BY MOUTH AT BEDTIME, Disp: , Rfl:     TRELEGY ELLIPTA 100-62.5-25 mcg DsDv, , Disp: , Rfl:     XARELTO 20 mg Tab, Take 20 mg by mouth., Disp: , Rfl:  Review of patient's allergies indicates:  No Known Allergies    DIAGNOSTICS    Labs/Scans/Micro:    CBC:   Lab Results   Component Value Date    WBC 13.82 (H) 2024    HGB 11.3 (L) 2024    HCT 36.7 (L) 2024    MCV 85.2 2024     2024 -         HOME HEALTH AGENCY: NONE  WOUND CARE ORDERS:  Right great toe wound: Cleanse with wound cleanser, apply mesalt, mepilex foam, 4x4 gauze, 2" coban to be changed daily         Follow up in 1 week (on 2025) for right 1st toe .       "

## 2025-05-07 ENCOUNTER — HOSPITAL ENCOUNTER (OUTPATIENT)
Dept: WOUND CARE | Facility: HOSPITAL | Age: 70
Discharge: HOME | End: 2025-05-07
Attending: FAMILY MEDICINE
Payer: MEDICARE

## 2025-05-07 VITALS
HEIGHT: 70 IN | WEIGHT: 212.06 LBS | RESPIRATION RATE: 18 BRPM | DIASTOLIC BLOOD PRESSURE: 84 MMHG | SYSTOLIC BLOOD PRESSURE: 155 MMHG | BODY MASS INDEX: 30.36 KG/M2 | HEART RATE: 69 BPM

## 2025-05-07 DIAGNOSIS — S91.101D OPEN WOUND OF RIGHT GREAT TOE, SUBSEQUENT ENCOUNTER: ICD-10-CM

## 2025-05-07 DIAGNOSIS — L97.512 NEUROPATHIC ULCER OF TOE, RIGHT, WITH FAT LAYER EXPOSED: Primary | ICD-10-CM

## 2025-05-07 PROCEDURE — 99213 OFFICE O/P EST LOW 20 MIN: CPT | Mod: 25

## 2025-05-07 PROCEDURE — 97597 DBRDMT OPN WND 1ST 20 CM/<: CPT

## 2025-05-07 PROCEDURE — 27000999 HC MEDICAL RECORD PHOTO DOCUMENTATION

## 2025-05-07 NOTE — PROGRESS NOTES
Home Health: NONE  Smoker  [] Yes  [x] No   Diabetic: NO  Doppler done in office? [x] Yes [] No   Date: 07/10/24     Right dorsalis: monophasic     Right posterior: monpophasic  Is patient eligible for HBO [] Yes [x] No   Start date:   Is the patient eligible for a graft, and/or currently grafting?  [] Yes [x] No    If so, which one/size?  Darco shoe with PEG assist given 4/16/25, size large        Subjective:       Patient ID: Paulo Dickerson is a 69 y.o. male.    Chief Complaint: Pressure Ulcer ((Right 1st toe - stage 2/Right 4th toe - stage 2))    HPI    January 3, 2025:  69-year-old white male, who is here for follow up of the open wound to the right great toe.  Since he was here last time, the wound has evidence only healed.  There some excess skin, which may need to be removed.  Otherwise, there is no open wound.  He did not go to work, after all.  He has been taking care of the toe, and keeping it dry.    4/9/25 - Mr. Dickerson returns today for the neuropathic ulcer at the right great toe.  He denies being diabetic but does have spinal issues.  He reports that two weeks ago the wound began opening at the distal end of the toe, and that it has continued to worse.  Today it was malodorous.  A selective debridement was performed with removal of the large area of undermining.  The wound was cultured and PO Rx Cipro and Doxycycline were sent to his pharmacy.     4/16/25 - The patient returns today for f/up on the right great toe ulcer.  He was not aware that he had Rx at the pharmacy, so has not begun abx.  Today we reviewed his culture results.  The wound has improved, surprisingly.  He declined the Rx x 7 days of Metronidazole due to alcohol intake.  He will  the Doxy and Cipro today and begin those abx.  We will reassess next for the need to discuss further Metronidazole.         4/24/25 - Mr. Dickerson is seen today for f/up on the right great toe neuropathic ulcer.  He has continue to take his PO abx (Cipro/Doxy)  and has one day remaining.  Today the wound is significantly improved, however, there is now a thick layer of slough across the wound bed.  He was unable to tolerate removal. We will begin using Mesalt on the wound.  The left great toe was assessed and a callus paring was performed.  There is no open wound at this time.     April 30, 2025:  69-year-old white male, who has recurrence of a distal right great toe ulceration.  It has been classified as a neuropathic ulcer.  He has tried using at least 3-4 appears of different shoes.  It does not appear related to pressure.  His arterial circulation was evaluated and found to have no compromise.  He has been using Mesalt.  Measurements are slightly improved since last week.    5/7/25 - Mr. Dickerson returns for f/up on the neuropathic ulcer at the right great toe.  The wound was selectively debrided and it does remain stable.  He has completed Cipro/Doxy based on his recent culture and today there are no S * S of infection.  However, today he does have a couple of new areas of concern to the right 4th/5th toes.  The 4th toe appears to be pressure and the 5th toe a dried hematoma.       Review of Systems   Constitutional: Negative.    Respiratory: Negative.     Cardiovascular: Negative.    Skin:         As documented in the HPI.   All other systems reviewed and are negative.        Objective:      Vitals:    05/07/25 1327   BP: (!) 155/84   Pulse: 69   Resp: 18       Physical Exam  Vitals and nursing note reviewed.   Constitutional:       Appearance: Normal appearance.   Cardiovascular:      Rate and Rhythm: Normal rate.   Pulmonary:      Effort: Pulmonary effort is normal.   Skin:     General: Skin is warm and dry.      Comments: right great toe    Neurological:      Mental Status: He is alert.            Wound 04/09/25 1310 Diabetic Ulcer Right plantar Toe, first #1 (Active)   04/09/25 1310 Toe, first   Present on Original Admission: Y   Primary Wound Type: Diabetic ulc    Side: Right   Orientation: plantar   Wound Approximate Age at First Assessment (Weeks):    Wound Number: #1   Is this injury device related?: No   Incision Type:    Closure Method:    Wound Description (Comments):    Type:    Additional Comments:    Ankle-Brachial Index:    Pulses:    Removal Indication and Assessment:    Wound Outcome:    Dressing Appearance Moist drainage 05/07/25 1330   Drainage Amount Moderate 05/07/25 1330   Drainage Characteristics/Odor Serosanguineous 05/07/25 1330   Appearance Red;Yellow;Slough;Moist 05/07/25 1330   Tissue loss description Full thickness 05/07/25 1330   Periwound Area Dry 05/07/25 1330   Wound Edges Callused;Open 05/07/25 1330   Wound Length (cm) 1.2 cm 05/07/25 1330   Wound Width (cm) 0.8 cm 05/07/25 1330   Wound Depth (cm) 0.2 cm 05/07/25 1330   Wound Volume (cm^3) 0.101 cm^3 05/07/25 1330   Wound Surface Area (cm^2) 0.75 cm^2 05/07/25 1330   Care Cleansed with:;Wound cleanser 05/07/25 1330   Dressing Applied 05/07/25 1330            Wound 05/07/25 1334 Other (comment) Right Toe, fourth #2 (Active)   05/07/25 1334 Toe, fourth   Present on Original Admission: Y   Primary Wound Type: Other   Side: Right   Orientation:    Wound Approximate Age at First Assessment (Weeks):    Wound Number: #2   Is this injury device related?:    Incision Type:    Closure Method:    Wound Description (Comments):    Type:    Additional Comments:    Ankle-Brachial Index:    Pulses:    Removal Indication and Assessment:    Wound Outcome:    Dressing Appearance Moist drainage 05/07/25 1330   Drainage Amount Moderate 05/07/25 1330   Drainage Characteristics/Odor Serosanguineous 05/07/25 1330   Appearance Yellow;Moist;Slough 05/07/25 1330   Tissue loss description Full thickness 05/07/25 1330   Periwound Area Dry 05/07/25 1330   Wound Edges Open 05/07/25 1330   Wound Length (cm) 1.5 cm 05/07/25 1330   Wound Width (cm) 1.6 cm 05/07/25 1330   Wound Depth (cm) 0.2 cm 05/07/25 1330   Wound Volume  "(cm^3) 0.251 cm^3 05/07/25 1330   Wound Surface Area (cm^2) 1.88 cm^2 05/07/25 1330   Care Cleansed with:;Wound cleanser 05/07/25 1330   Dressing Applied 05/07/25 1330        5/7/25             Right 1st toe (pre)                                             Right 1st toe (pre)                                            Right 4th toe (pre)                                            Right 5th toe (pre)                   Right 1st toe (post)                                            Right 1st toe (post)                                          Right 4th/5th toes  (Betadine, mesalt, mepilex, 2" kerlix, 2" coban)                                                                     (betadine, mepilex, bandaid - both toes)                                                                                           Assessment:         ICD-10-CM ICD-9-CM   1. Neuropathic ulcer of toe, right, with fat layer exposed  L97.512 707.15   2. Open wound of right great toe, subsequent encounter  S91.101D V58.89     893.0         Procedures:     Debridement     Date/Time: 5/7/2025 1:00 PM     Performed by: Nancy Noyola NP  Authorized by: Nancy Noyola NP    Time out: Immediately prior to procedure a "time out" was called to verify the correct patient, procedure, equipment, support staff and site/side marked as required.     Consent Done?:  Yes (Verbal)     Preparation: Patient was prepped and draped with clean technique    Local anesthesia used?: No       Wound Details:    Location:  Right foot    Location:  Right 1st Toe    Type of Debridement:  Non-excisional       Length (cm):  1.2       Width (cm):  0.8       Depth (cm):  0.2       Area (sq cm):  0.75       Percent Debrided (%):  100       Total Area Debrided (sq cm):  0.75    Depth of debridement:  Epidermis/Dermis    Devitalized tissue debrided:  Biofilm, Callus, Exudate, Fibrin and Slough    Instruments:  Forceps, Scissors and Curette (Dermal)  Bleeding:  None  Patient " tolerance:  Patient tolerated the procedure well with no immediate complications       [] Yes [] No   I & D performed  [] Yes [] No   Excisional debridement performed  [x] Yes [] No   Selective debridement performed           [] Yes [] No   Mechanical debridement performed         [] Yes [] No  Silver nitrate applied                                     [] Yes [] No  Labs ordered this visit                                  [] Yes [] No   Imaging ordered this visit                           [] Yes [] No   Tissue pathology and/or culture taken         MEDICATIONS    Current Outpatient Medications:     albuterol-ipratropium (DUO-NEB) 2.5 mg-0.5 mg/3 mL nebulizer solution, Take 3 mLs by nebulization every 6 (six) hours as needed for Wheezing. Rescue, Disp: 75 mL, Rfl: 0    amLODIPine-benazepriL (LOTREL) 10-40 mg per capsule, amlodipine 10 mg-benazepril 40 mg capsule  TAKE 1 CAPSULE BY MOUTH EVERY DAY, Disp: , Rfl:     anastrozole (ARIMIDEX) 1 mg Tab, Take 1 mg by mouth every 7 days., Disp: , Rfl:     bisoprolol (ZEBETA) 10 MG tablet, Take 20 mg by mouth., Disp: , Rfl:     celecoxib (CELEBREX) 200 MG capsule, celecoxib 200 mg capsule  TAKE 1 CAPSULE BY MOUTH EVERY DAY TAKE WITH FOOD, Disp: , Rfl:     famotidine (PEPCID) 40 MG tablet, TAKE ONE TABLET BY MOUTH AT NIGHT, Disp: , Rfl:     fenofibrate (TRICOR) 48 MG tablet, fenofibrate nanocrystallized 48 mg tablet  TAKE 1 TABLET BY MOUTH EVERY DAY FOR TRIGLYCERIDES, Disp: , Rfl:     furosemide (LASIX) 20 MG tablet, Taking one every other day, Disp: , Rfl:     gabapentin (NEURONTIN) 600 MG tablet, gabapentin 600 mg tablet  TAKE ONE TABLET BY MOUTH THREE TIMES DAILY, Disp: , Rfl:     HYDROcodone-acetaminophen (NORCO)  mg per tablet, Take 1 tablet by mouth every evening., Disp: , Rfl:     omega-3 fatty acids/fish oil (FISH OIL-OMEGA-3 FATTY ACIDS) 300-1,000 mg capsule, Take 2 capsules by mouth once daily., Disp: , Rfl:     omeprazole (PRILOSEC) 40 MG capsule, TAKE 1  "CAPSULE BY MOUTH ONCE DAILY 30 MINUTES BEFORE A MEAL, Disp: , Rfl:     oxyCODONE-acetaminophen (PERCOCET)  mg per tablet, Take 1 tablet by mouth every 6 (six) hours as needed., Disp: , Rfl:     potassium chloride (KLOR-CON) 10 MEQ TbSR, Take 10 mEq by mouth every other day., Disp: , Rfl:     primidone (MYSOLINE) 50 MG Tab, , Disp: , Rfl:     rosuvastatin (CRESTOR) 5 MG tablet, , Disp: , Rfl:     testosterone cypionate (DEPOTESTOTERONE CYPIONATE) 200 mg/mL injection, SMARTSI.7 Milliliter(s) IM Once a Week, Disp: , Rfl:     tiZANidine (ZANAFLEX) 4 MG tablet, TAKE TWO TABLETS BY MOUTH AT BEDTIME must last 90 DAYS, Disp: , Rfl:     traZODone (DESYREL) 100 MG tablet, trazodone 100 mg tablet  TAKE 1 TABLET BY MOUTH AT BEDTIME, Disp: , Rfl:     TRELEGY ELLIPTA 100-62.5-25 mcg DsDv, , Disp: , Rfl:     XARELTO 20 mg Tab, Take 20 mg by mouth., Disp: , Rfl:  Review of patient's allergies indicates:  No Known Allergies    DIAGNOSTICS    Labs/Scans/Micro:    CBC:   Lab Results   Component Value Date    WBC 13.82 (H) 2024    HGB 11.3 (L) 2024    HCT 36.7 (L) 2024    MCV 85.2 2024     2024 -         HOME HEALTH AGENCY: NONE  WOUND CARE ORDERS:  Right great toe wound: Cleanse with wound cleanser, paint around wound with betadine, apply mesalt, mepilex foam, 2" kerlix, 2" coban to be changed daily   Right 4th toe/5th toe: Cleanse with wound cleanser, paint with betadine, apply mepilex foam and bandaids, change daily      Follow up in 1 week (on 2025) for right foot .       "

## 2025-05-07 NOTE — PROCEDURES
"Debridement    Date/Time: 5/7/2025 1:00 PM    Performed by: Nancy Noyola NP  Authorized by: Nancy Noyola NP    Time out: Immediately prior to procedure a "time out" was called to verify the correct patient, procedure, equipment, support staff and site/side marked as required.    Consent Done?:  Yes (Verbal)    Preparation: Patient was prepped and draped with clean technique    Local anesthesia used?: No      Wound Details:    Location:  Right foot    Location:  Right 1st Toe    Type of Debridement:  Non-excisional       Length (cm):  1.2       Width (cm):  0.8       Depth (cm):  0.2       Area (sq cm):  0.75       Percent Debrided (%):  100       Total Area Debrided (sq cm):  0.75    Depth of debridement:  Epidermis/Dermis    Devitalized tissue debrided:  Biofilm, Callus, Exudate, Fibrin and Slough    Instruments:  Forceps, Scissors and Curette (Dermal)  Bleeding:  None  Patient tolerance:  Patient tolerated the procedure well with no immediate complications    " Pt present today for cardiac rehab. Pre exercise L arm standing BP: 84/60. Pt reports that he was asymptomatic and drank 2 bottles of water and ate crackers this morning. Staff gave pt one cup of water and re-checked after about 5 minutes. L arm: 82/60 and R arm: 82/60. BP did come up after about 10 minutes to 92/64 but dropped again when checking when standing at 82/60. Pt asymptomatic the entire time. Staff sent pt home d/t the low blood pressures per cardiac rehab guidelines too low to exercise today. Staff encouraged hydration at home and pt asymptomatic when leaving.     Please review the most recent BP's below and update pt with any changes. If unable to view, please see media tab with document upload named [email or web address].    As patient has struggled with low blood pressures and being sent home, are there new parameter that MD could suggest regarding exercise? Current Cardiac rehab guidelines state patient must be over 90 systolic to start exercise. For example, is patient okay to start exercise with systolic greater than 85 if asymptomatic? Please advise

## 2025-08-06 ENCOUNTER — HOSPITAL ENCOUNTER (INPATIENT)
Facility: HOSPITAL | Age: 70
LOS: 1 days | Discharge: HOME | DRG: 291 | End: 2025-08-08
Attending: EMERGENCY MEDICINE | Admitting: FAMILY MEDICINE
Payer: MEDICARE

## 2025-08-06 DIAGNOSIS — R06.00 DYSPNEA: ICD-10-CM

## 2025-08-06 DIAGNOSIS — I50.9 ACUTE CHF (CONGESTIVE HEART FAILURE): ICD-10-CM

## 2025-08-06 DIAGNOSIS — J96.01 ACUTE HYPOXEMIC RESPIRATORY FAILURE: ICD-10-CM

## 2025-08-06 DIAGNOSIS — J18.9 COMMUNITY ACQUIRED PNEUMONIA, UNSPECIFIED LATERALITY: ICD-10-CM

## 2025-08-06 DIAGNOSIS — I50.23 CHF (CONGESTIVE HEART FAILURE), NYHA CLASS III, ACUTE ON CHRONIC, SYSTOLIC: Primary | ICD-10-CM

## 2025-08-06 LAB
ANION GAP SERPL CALC-SCNC: 10 MEQ/L
BASOPHILS # BLD AUTO: 0.06 X10(3)/MCL
BASOPHILS NFR BLD AUTO: 0.6 %
BUN SERPL-MCNC: 31 MG/DL (ref 8.4–25.7)
CALCIUM SERPL-MCNC: 8.9 MG/DL (ref 8.8–10)
CHLORIDE SERPL-SCNC: 104 MMOL/L (ref 98–107)
CO2 SERPL-SCNC: 23 MMOL/L (ref 23–31)
CREAT SERPL-MCNC: 2.08 MG/DL (ref 0.72–1.25)
CREAT/UREA NIT SERPL: 15
D DIMER PPP IA.FEU-MCNC: 0.43 UG/ML FEU (ref 0–0.5)
EOSINOPHIL # BLD AUTO: 0.31 X10(3)/MCL (ref 0–0.9)
EOSINOPHIL NFR BLD AUTO: 2.9 %
ERYTHROCYTE [DISTWIDTH] IN BLOOD BY AUTOMATED COUNT: 20.1 % (ref 11.5–17)
GFR SERPLBLD CREATININE-BSD FMLA CKD-EPI: 34 ML/MIN/1.73/M2
GLUCOSE SERPL-MCNC: 106 MG/DL (ref 82–115)
HCT VFR BLD AUTO: 37.5 % (ref 42–52)
HGB BLD-MCNC: 11.5 G/DL (ref 14–18)
IMM GRANULOCYTES # BLD AUTO: 0.06 X10(3)/MCL (ref 0–0.04)
IMM GRANULOCYTES NFR BLD AUTO: 0.6 %
LACTATE SERPL-SCNC: 1.2 MMOL/L (ref 0.5–2.2)
LYMPHOCYTES # BLD AUTO: 1.97 X10(3)/MCL (ref 0.6–4.6)
LYMPHOCYTES NFR BLD AUTO: 18.2 %
MCH RBC QN AUTO: 25.4 PG (ref 27–31)
MCHC RBC AUTO-ENTMCNC: 30.7 G/DL (ref 33–36)
MCV RBC AUTO: 82.8 FL (ref 80–94)
MONOCYTES # BLD AUTO: 1.06 X10(3)/MCL (ref 0.1–1.3)
MONOCYTES NFR BLD AUTO: 9.8 %
NEUTROPHILS # BLD AUTO: 7.34 X10(3)/MCL (ref 2.1–9.2)
NEUTROPHILS NFR BLD AUTO: 67.9 %
NRBC BLD AUTO-RTO: 0 %
NT-PROBNP SERPL-MCNC: 2515 PG/ML
OHS QRS DURATION: 98 MS
OHS QTC CALCULATION: 410 MS
PLATELET # BLD AUTO: 190 X10(3)/MCL (ref 130–400)
PMV BLD AUTO: 11.6 FL (ref 7.4–10.4)
POTASSIUM SERPL-SCNC: 5.1 MMOL/L (ref 3.5–5.1)
RBC # BLD AUTO: 4.53 X10(6)/MCL (ref 4.7–6.1)
SODIUM SERPL-SCNC: 137 MMOL/L (ref 136–145)
TROPONIN I SERPL HS-MCNC: 4 NG/L
WBC # BLD AUTO: 10.8 X10(3)/MCL (ref 4.5–11.5)

## 2025-08-06 PROCEDURE — 25000242 PHARM REV CODE 250 ALT 637 W/ HCPCS: Performed by: FAMILY MEDICINE

## 2025-08-06 PROCEDURE — G0378 HOSPITAL OBSERVATION PER HR: HCPCS

## 2025-08-06 PROCEDURE — 93010 ELECTROCARDIOGRAM REPORT: CPT | Mod: ,,, | Performed by: INTERNAL MEDICINE

## 2025-08-06 PROCEDURE — 85025 COMPLETE CBC W/AUTO DIFF WBC: CPT | Performed by: EMERGENCY MEDICINE

## 2025-08-06 PROCEDURE — 94640 AIRWAY INHALATION TREATMENT: CPT

## 2025-08-06 PROCEDURE — 63600175 PHARM REV CODE 636 W HCPCS: Performed by: EMERGENCY MEDICINE

## 2025-08-06 PROCEDURE — 27100171 HC OXYGEN HIGH FLOW UP TO 24 HOURS

## 2025-08-06 PROCEDURE — 96365 THER/PROPH/DIAG IV INF INIT: CPT

## 2025-08-06 PROCEDURE — 99285 EMERGENCY DEPT VISIT HI MDM: CPT | Mod: 25

## 2025-08-06 PROCEDURE — 27000221 HC OXYGEN, UP TO 24 HOURS

## 2025-08-06 PROCEDURE — 25000003 PHARM REV CODE 250: Performed by: FAMILY MEDICINE

## 2025-08-06 PROCEDURE — 99900035 HC TECH TIME PER 15 MIN (STAT)

## 2025-08-06 PROCEDURE — 83605 ASSAY OF LACTIC ACID: CPT | Performed by: EMERGENCY MEDICINE

## 2025-08-06 PROCEDURE — 27000190 HC CPAP FULL FACE MASK W/VALVE

## 2025-08-06 PROCEDURE — 25000003 PHARM REV CODE 250: Performed by: EMERGENCY MEDICINE

## 2025-08-06 PROCEDURE — 94761 N-INVAS EAR/PLS OXIMETRY MLT: CPT

## 2025-08-06 PROCEDURE — 87040 BLOOD CULTURE FOR BACTERIA: CPT | Mod: 91 | Performed by: EMERGENCY MEDICINE

## 2025-08-06 PROCEDURE — 85379 FIBRIN DEGRADATION QUANT: CPT | Performed by: EMERGENCY MEDICINE

## 2025-08-06 PROCEDURE — 93005 ELECTROCARDIOGRAM TRACING: CPT

## 2025-08-06 PROCEDURE — 84484 ASSAY OF TROPONIN QUANT: CPT | Performed by: EMERGENCY MEDICINE

## 2025-08-06 PROCEDURE — 63600175 PHARM REV CODE 636 W HCPCS: Performed by: FAMILY MEDICINE

## 2025-08-06 PROCEDURE — 83880 ASSAY OF NATRIURETIC PEPTIDE: CPT | Performed by: EMERGENCY MEDICINE

## 2025-08-06 PROCEDURE — 80048 BASIC METABOLIC PNL TOTAL CA: CPT | Performed by: EMERGENCY MEDICINE

## 2025-08-06 PROCEDURE — 94660 CPAP INITIATION&MGMT: CPT

## 2025-08-06 PROCEDURE — 51798 US URINE CAPACITY MEASURE: CPT

## 2025-08-06 PROCEDURE — 96375 TX/PRO/DX INJ NEW DRUG ADDON: CPT

## 2025-08-06 RX ORDER — PRIMIDONE 50 MG/1
100 TABLET ORAL 3 TIMES DAILY
Status: DISCONTINUED | OUTPATIENT
Start: 2025-08-06 | End: 2025-08-08 | Stop reason: HOSPADM

## 2025-08-06 RX ORDER — FUROSEMIDE 10 MG/ML
20 INJECTION INTRAMUSCULAR; INTRAVENOUS EVERY 12 HOURS
Status: DISCONTINUED | OUTPATIENT
Start: 2025-08-06 | End: 2025-08-08 | Stop reason: HOSPADM

## 2025-08-06 RX ORDER — GABAPENTIN 300 MG/1
600 CAPSULE ORAL
Status: COMPLETED | OUTPATIENT
Start: 2025-08-06 | End: 2025-08-06

## 2025-08-06 RX ORDER — BUDESONIDE 0.5 MG/2ML
0.5 INHALANT ORAL EVERY 12 HOURS
Status: DISCONTINUED | OUTPATIENT
Start: 2025-08-06 | End: 2025-08-08 | Stop reason: HOSPADM

## 2025-08-06 RX ORDER — FENOFIBRATE 145 MG/1
145 TABLET, FILM COATED ORAL DAILY
Status: DISCONTINUED | OUTPATIENT
Start: 2025-08-07 | End: 2025-08-08 | Stop reason: HOSPADM

## 2025-08-06 RX ORDER — PANTOPRAZOLE SODIUM 40 MG/1
40 TABLET, DELAYED RELEASE ORAL DAILY
Status: DISCONTINUED | OUTPATIENT
Start: 2025-08-07 | End: 2025-08-08 | Stop reason: HOSPADM

## 2025-08-06 RX ORDER — GABAPENTIN 100 MG/1
100 CAPSULE ORAL 3 TIMES DAILY
Status: DISCONTINUED | OUTPATIENT
Start: 2025-08-06 | End: 2025-08-08 | Stop reason: HOSPADM

## 2025-08-06 RX ORDER — FUROSEMIDE 10 MG/ML
40 INJECTION INTRAMUSCULAR; INTRAVENOUS
Status: COMPLETED | OUTPATIENT
Start: 2025-08-06 | End: 2025-08-06

## 2025-08-06 RX ORDER — TRAZODONE HYDROCHLORIDE 50 MG/1
100 TABLET ORAL NIGHTLY
Status: DISCONTINUED | OUTPATIENT
Start: 2025-08-06 | End: 2025-08-08 | Stop reason: HOSPADM

## 2025-08-06 RX ORDER — SODIUM CHLORIDE 0.9 % (FLUSH) 0.9 %
10 SYRINGE (ML) INJECTION
Status: DISCONTINUED | OUTPATIENT
Start: 2025-08-06 | End: 2025-08-08 | Stop reason: HOSPADM

## 2025-08-06 RX ORDER — TAMSULOSIN HYDROCHLORIDE 0.4 MG/1
0.4 CAPSULE ORAL NIGHTLY
Status: DISCONTINUED | OUTPATIENT
Start: 2025-08-06 | End: 2025-08-08 | Stop reason: HOSPADM

## 2025-08-06 RX ORDER — CEFTRIAXONE 1 G/1
1 INJECTION, POWDER, FOR SOLUTION INTRAMUSCULAR; INTRAVENOUS
Status: COMPLETED | OUTPATIENT
Start: 2025-08-06 | End: 2025-08-06

## 2025-08-06 RX ORDER — METOPROLOL TARTRATE 50 MG/1
50 TABLET ORAL 2 TIMES DAILY
Status: DISCONTINUED | OUTPATIENT
Start: 2025-08-06 | End: 2025-08-08

## 2025-08-06 RX ORDER — LEVALBUTEROL INHALATION SOLUTION 1.25 MG/3ML
1.25 SOLUTION RESPIRATORY (INHALATION) EVERY 8 HOURS
Status: DISCONTINUED | OUTPATIENT
Start: 2025-08-06 | End: 2025-08-08 | Stop reason: HOSPADM

## 2025-08-06 RX ORDER — ATORVASTATIN CALCIUM 20 MG/1
20 TABLET, FILM COATED ORAL DAILY
Status: DISCONTINUED | OUTPATIENT
Start: 2025-08-07 | End: 2025-08-08 | Stop reason: HOSPADM

## 2025-08-06 RX ORDER — TALC
6 POWDER (GRAM) TOPICAL NIGHTLY PRN
Status: DISCONTINUED | OUTPATIENT
Start: 2025-08-06 | End: 2025-08-08 | Stop reason: HOSPADM

## 2025-08-06 RX ADMIN — AZITHROMYCIN DIHYDRATE 500 MG: 500 INJECTION, POWDER, LYOPHILIZED, FOR SOLUTION INTRAVENOUS at 04:08

## 2025-08-06 RX ADMIN — METOPROLOL TARTRATE 50 MG: 50 TABLET, FILM COATED ORAL at 10:08

## 2025-08-06 RX ADMIN — FUROSEMIDE 20 MG: 10 INJECTION, SOLUTION INTRAMUSCULAR; INTRAVENOUS at 10:08

## 2025-08-06 RX ADMIN — GABAPENTIN 600 MG: 300 CAPSULE ORAL at 03:08

## 2025-08-06 RX ADMIN — LEVALBUTEROL HYDROCHLORIDE 1.25 MG: 1.25 SOLUTION RESPIRATORY (INHALATION) at 08:08

## 2025-08-06 RX ADMIN — GABAPENTIN 100 MG: 100 CAPSULE ORAL at 10:08

## 2025-08-06 RX ADMIN — TAMSULOSIN HYDROCHLORIDE 0.4 MG: 0.4 CAPSULE ORAL at 10:08

## 2025-08-06 RX ADMIN — CEFTRIAXONE SODIUM 1 G: 1 INJECTION, POWDER, FOR SOLUTION INTRAMUSCULAR; INTRAVENOUS at 04:08

## 2025-08-06 RX ADMIN — PRIMIDONE 100 MG: 50 TABLET ORAL at 10:08

## 2025-08-06 RX ADMIN — BUDESONIDE INHALATION 0.5 MG: 0.5 SUSPENSION RESPIRATORY (INHALATION) at 08:08

## 2025-08-06 RX ADMIN — TRAZODONE HYDROCHLORIDE 100 MG: 50 TABLET ORAL at 10:08

## 2025-08-06 RX ADMIN — FUROSEMIDE 40 MG: 10 INJECTION, SOLUTION INTRAVENOUS at 11:08

## 2025-08-07 PROBLEM — R79.89 ELEVATED BRAIN NATRIURETIC PEPTIDE (BNP) LEVEL: Status: ACTIVE | Noted: 2025-08-07

## 2025-08-07 PROBLEM — J90 PLEURAL EFFUSION: Status: ACTIVE | Noted: 2025-08-07

## 2025-08-07 PROBLEM — I11.0 HYPERTENSIVE HEART DISEASE WITH CONGESTIVE HEART FAILURE: Status: ACTIVE | Noted: 2025-08-07

## 2025-08-07 PROBLEM — N40.0 BPH (BENIGN PROSTATIC HYPERPLASIA): Status: ACTIVE | Noted: 2025-08-07

## 2025-08-07 PROBLEM — I50.23 CHF (CONGESTIVE HEART FAILURE), NYHA CLASS III, ACUTE ON CHRONIC, SYSTOLIC: Status: ACTIVE | Noted: 2025-08-07

## 2025-08-07 PROBLEM — N17.9 AKI (ACUTE KIDNEY INJURY): Status: ACTIVE | Noted: 2025-08-07

## 2025-08-07 PROBLEM — I48.20 CHRONIC A-FIB: Status: ACTIVE | Noted: 2025-08-07

## 2025-08-07 PROBLEM — J18.9 PNEUMONIA: Status: ACTIVE | Noted: 2025-08-07

## 2025-08-07 PROBLEM — Z72.0 VAPES NICOTINE CONTAINING SUBSTANCE: Status: ACTIVE | Noted: 2025-08-07

## 2025-08-07 PROBLEM — R33.9 URINARY RETENTION: Status: ACTIVE | Noted: 2025-08-07

## 2025-08-07 LAB
ALBUMIN SERPL-MCNC: 3.2 G/DL (ref 3.4–4.8)
ALBUMIN/GLOB SERPL: 0.9 RATIO (ref 1.1–2)
ALP SERPL-CCNC: 35 UNIT/L (ref 40–150)
ALT SERPL-CCNC: 25 UNIT/L (ref 0–55)
ANION GAP SERPL CALC-SCNC: 8 MEQ/L
APICAL FOUR CHAMBER EJECTION FRACTION: 69 %
APICAL TWO CHAMBER EJECTION FRACTION: 49 %
AST SERPL-CCNC: 28 UNIT/L (ref 11–45)
AV PEAK GRADIENT: 13 MMHG
AV REGURGITATION PRESSURE HALF TIME: 616 MS
AV VALVE AREA BY VELOCITY RATIO: 1.7 CM²
AV VELOCITY RATIO: 0.61
BASOPHILS # BLD AUTO: 0.05 X10(3)/MCL
BASOPHILS NFR BLD AUTO: 0.6 %
BILIRUB SERPL-MCNC: 0.7 MG/DL
BSA FOR ECHO PROCEDURE: 2.18 M2
BUN SERPL-MCNC: 24 MG/DL (ref 8.4–25.7)
CALCIUM SERPL-MCNC: 8.8 MG/DL (ref 8.8–10)
CHLORIDE SERPL-SCNC: 102 MMOL/L (ref 98–107)
CO2 SERPL-SCNC: 30 MMOL/L (ref 23–31)
CREAT SERPL-MCNC: 1.5 MG/DL (ref 0.72–1.25)
CREAT/UREA NIT SERPL: 16
CV ECHO LV RWT: 0.62 CM
DOP CALC AO PEAK VEL: 1.8 M/S
DOP CALC LVOT AREA: 2.8 CM2
DOP CALC LVOT DIAMETER: 1.9 CM
DOP CALC LVOT PEAK VEL: 1.1 M/S
DOP CALC MV VTI: 35.7 CM
E WAVE DECELERATION TIME: 285 MSEC
E/A RATIO: 3.93
ECHO LV POSTERIOR WALL: 1.4 CM (ref 0.6–1.1)
EOSINOPHIL # BLD AUTO: 0.48 X10(3)/MCL (ref 0–0.9)
EOSINOPHIL NFR BLD AUTO: 5.3 %
ERYTHROCYTE [DISTWIDTH] IN BLOOD BY AUTOMATED COUNT: 19.8 % (ref 11.5–17)
FRACTIONAL SHORTENING: 35.6 % (ref 28–44)
GFR SERPLBLD CREATININE-BSD FMLA CKD-EPI: 50 ML/MIN/1.73/M2
GLOBULIN SER-MCNC: 3.5 GM/DL (ref 2.4–3.5)
GLUCOSE SERPL-MCNC: 90 MG/DL (ref 82–115)
HCT VFR BLD AUTO: 36.1 % (ref 42–52)
HGB BLD-MCNC: 11 G/DL (ref 14–18)
IMM GRANULOCYTES # BLD AUTO: 0.03 X10(3)/MCL (ref 0–0.04)
IMM GRANULOCYTES NFR BLD AUTO: 0.3 %
INTERVENTRICULAR SEPTUM: 1.4 CM (ref 0.6–1.1)
LEFT ATRIUM SIZE: 5.2 CM
LEFT INTERNAL DIMENSION IN SYSTOLE: 2.9 CM (ref 2.1–4)
LEFT VENTRICLE DIASTOLIC VOLUME INDEX: 42.99 ML/M2
LEFT VENTRICLE DIASTOLIC VOLUME: 92 ML
LEFT VENTRICLE END DIASTOLIC VOLUME APICAL 2 CHAMBER: 25.05 ML
LEFT VENTRICLE END DIASTOLIC VOLUME APICAL 4 CHAMBER INDEX BSA: 20.14 ML/M2
LEFT VENTRICLE END DIASTOLIC VOLUME APICAL 4 CHAMBER: 43.1 ML
LEFT VENTRICLE MASS INDEX: 116.1 G/M2
LEFT VENTRICLE SYSTOLIC VOLUME INDEX: 15 ML/M2
LEFT VENTRICLE SYSTOLIC VOLUME: 32 ML
LEFT VENTRICULAR INTERNAL DIMENSION IN DIASTOLE: 4.5 CM (ref 3.5–6)
LEFT VENTRICULAR MASS: 248.4 G
LVED V (TEICH): 92.13 ML
LVES V (TEICH): 32.22 ML
LYMPHOCYTES # BLD AUTO: 1.6 X10(3)/MCL (ref 0.6–4.6)
LYMPHOCYTES NFR BLD AUTO: 17.6 %
MCH RBC QN AUTO: 25.2 PG (ref 27–31)
MCHC RBC AUTO-ENTMCNC: 30.5 G/DL (ref 33–36)
MCV RBC AUTO: 82.8 FL (ref 80–94)
MONOCYTES # BLD AUTO: 1.04 X10(3)/MCL (ref 0.1–1.3)
MONOCYTES NFR BLD AUTO: 11.5 %
MV MEAN GRADIENT: 3 MMHG
MV PEAK A VEL: 0.3 M/S
MV PEAK E VEL: 1.18 M/S
MV PEAK GRADIENT: 9 MMHG
MV STENOSIS PRESSURE HALF TIME: 82.56 MS
MV VALVE AREA P 1/2 METHOD: 2.66 CM2
NEUTROPHILS # BLD AUTO: 5.87 X10(3)/MCL (ref 2.1–9.2)
NEUTROPHILS NFR BLD AUTO: 64.7 %
NRBC BLD AUTO-RTO: 0 %
OHS CV RV/LV RATIO: 0.64 CM
OHS LV EJECTION FRACTION SIMPSONS BIPLANE MOD: 61 %
PISA AR MAX VEL: 3.17 M/S
PISA MRMAX VEL: 3.47 M/S
PISA TR MAX VEL: 3 M/S
PLATELET # BLD AUTO: 186 X10(3)/MCL (ref 130–400)
PMV BLD AUTO: 11.5 FL (ref 7.4–10.4)
POTASSIUM SERPL-SCNC: 4 MMOL/L (ref 3.5–5.1)
PROT SERPL-MCNC: 6.7 GM/DL (ref 5.8–7.6)
PV PEAK GRADIENT: 3 MMHG
PV PEAK VELOCITY: 0.86 M/S
RBC # BLD AUTO: 4.36 X10(6)/MCL (ref 4.7–6.1)
RIGHT VENTRICLE DIASTOLIC BASEL DIMENSION: 2.9 CM
RIGHT VENTRICULAR END-DIASTOLIC DIMENSION: 2.91 CM
SODIUM SERPL-SCNC: 140 MMOL/L (ref 136–145)
TR MAX PG: 36 MMHG
WBC # BLD AUTO: 9.07 X10(3)/MCL (ref 4.5–11.5)
Z-SCORE OF LEFT VENTRICULAR DIMENSION IN END DIASTOLE: -4.25
Z-SCORE OF LEFT VENTRICULAR DIMENSION IN END SYSTOLE: -2.92

## 2025-08-07 PROCEDURE — 63600175 PHARM REV CODE 636 W HCPCS: Performed by: FAMILY MEDICINE

## 2025-08-07 PROCEDURE — 94799 UNLISTED PULMONARY SVC/PX: CPT

## 2025-08-07 PROCEDURE — 94640 AIRWAY INHALATION TREATMENT: CPT | Mod: XB

## 2025-08-07 PROCEDURE — 99900035 HC TECH TIME PER 15 MIN (STAT)

## 2025-08-07 PROCEDURE — 94761 N-INVAS EAR/PLS OXIMETRY MLT: CPT

## 2025-08-07 PROCEDURE — 80053 COMPREHEN METABOLIC PANEL: CPT | Performed by: FAMILY MEDICINE

## 2025-08-07 PROCEDURE — 27000221 HC OXYGEN, UP TO 24 HOURS

## 2025-08-07 PROCEDURE — 25000003 PHARM REV CODE 250: Performed by: FAMILY MEDICINE

## 2025-08-07 PROCEDURE — 25000242 PHARM REV CODE 250 ALT 637 W/ HCPCS: Performed by: FAMILY MEDICINE

## 2025-08-07 PROCEDURE — 27100171 HC OXYGEN HIGH FLOW UP TO 24 HOURS

## 2025-08-07 PROCEDURE — 94660 CPAP INITIATION&MGMT: CPT

## 2025-08-07 PROCEDURE — G0378 HOSPITAL OBSERVATION PER HR: HCPCS

## 2025-08-07 PROCEDURE — 85025 COMPLETE CBC W/AUTO DIFF WBC: CPT | Performed by: FAMILY MEDICINE

## 2025-08-07 PROCEDURE — 36415 COLL VENOUS BLD VENIPUNCTURE: CPT | Performed by: FAMILY MEDICINE

## 2025-08-07 RX ORDER — HYDROCODONE BITARTRATE AND ACETAMINOPHEN 10; 325 MG/1; MG/1
1 TABLET ORAL 2 TIMES DAILY PRN
Refills: 0 | Status: DISCONTINUED | OUTPATIENT
Start: 2025-08-07 | End: 2025-08-08 | Stop reason: HOSPADM

## 2025-08-07 RX ORDER — CEFTRIAXONE 1 G/1
1 INJECTION, POWDER, FOR SOLUTION INTRAMUSCULAR; INTRAVENOUS
Status: DISCONTINUED | OUTPATIENT
Start: 2025-08-07 | End: 2025-08-08 | Stop reason: HOSPADM

## 2025-08-07 RX ADMIN — AZITHROMYCIN DIHYDRATE 500 MG: 500 INJECTION, POWDER, LYOPHILIZED, FOR SOLUTION INTRAVENOUS at 08:08

## 2025-08-07 RX ADMIN — PRIMIDONE 100 MG: 50 TABLET ORAL at 08:08

## 2025-08-07 RX ADMIN — GABAPENTIN 100 MG: 100 CAPSULE ORAL at 08:08

## 2025-08-07 RX ADMIN — FENOFIBRATE 145 MG: 145 TABLET, FILM COATED ORAL at 09:08

## 2025-08-07 RX ADMIN — ATORVASTATIN CALCIUM 20 MG: 20 TABLET, FILM COATED ORAL at 08:08

## 2025-08-07 RX ADMIN — FUROSEMIDE 20 MG: 10 INJECTION, SOLUTION INTRAMUSCULAR; INTRAVENOUS at 10:08

## 2025-08-07 RX ADMIN — GABAPENTIN 100 MG: 100 CAPSULE ORAL at 04:08

## 2025-08-07 RX ADMIN — PANTOPRAZOLE SODIUM 40 MG: 40 TABLET, DELAYED RELEASE ORAL at 08:08

## 2025-08-07 RX ADMIN — PRIMIDONE 100 MG: 50 TABLET ORAL at 04:08

## 2025-08-07 RX ADMIN — HYDROCODONE BITARTRATE AND ACETAMINOPHEN 1 TABLET: 10; 325 TABLET ORAL at 09:08

## 2025-08-07 RX ADMIN — METOPROLOL TARTRATE 50 MG: 50 TABLET, FILM COATED ORAL at 09:08

## 2025-08-07 RX ADMIN — BUDESONIDE INHALATION 0.5 MG: 0.5 SUSPENSION RESPIRATORY (INHALATION) at 07:08

## 2025-08-07 RX ADMIN — GABAPENTIN 100 MG: 100 CAPSULE ORAL at 09:08

## 2025-08-07 RX ADMIN — RIVAROXABAN 20 MG: 10 TABLET, FILM COATED ORAL at 04:08

## 2025-08-07 RX ADMIN — TRAZODONE HYDROCHLORIDE 100 MG: 50 TABLET ORAL at 09:08

## 2025-08-07 RX ADMIN — FUROSEMIDE 20 MG: 10 INJECTION, SOLUTION INTRAMUSCULAR; INTRAVENOUS at 09:08

## 2025-08-07 RX ADMIN — LEVALBUTEROL HYDROCHLORIDE 1.25 MG: 1.25 SOLUTION RESPIRATORY (INHALATION) at 07:08

## 2025-08-07 RX ADMIN — TAMSULOSIN HYDROCHLORIDE 0.4 MG: 0.4 CAPSULE ORAL at 09:08

## 2025-08-07 RX ADMIN — CEFTRIAXONE SODIUM 1 G: 1 INJECTION, POWDER, FOR SOLUTION INTRAMUSCULAR; INTRAVENOUS at 10:08

## 2025-08-07 RX ADMIN — METOPROLOL TARTRATE 50 MG: 50 TABLET, FILM COATED ORAL at 08:08

## 2025-08-07 RX ADMIN — PRIMIDONE 100 MG: 50 TABLET ORAL at 09:08

## 2025-08-08 VITALS
DIASTOLIC BLOOD PRESSURE: 76 MMHG | RESPIRATION RATE: 18 BRPM | HEIGHT: 70 IN | BODY MASS INDEX: 30.35 KG/M2 | SYSTOLIC BLOOD PRESSURE: 139 MMHG | HEART RATE: 75 BPM | TEMPERATURE: 98 F | WEIGHT: 212 LBS | OXYGEN SATURATION: 94 %

## 2025-08-08 LAB
ALBUMIN SERPL-MCNC: 3 G/DL (ref 3.4–4.8)
ALBUMIN/GLOB SERPL: 0.9 RATIO (ref 1.1–2)
ALP SERPL-CCNC: 33 UNIT/L (ref 40–150)
ALT SERPL-CCNC: 16 UNIT/L (ref 0–55)
ANION GAP SERPL CALC-SCNC: 7 MEQ/L
AST SERPL-CCNC: 19 UNIT/L (ref 11–45)
BASOPHILS # BLD AUTO: 0.04 X10(3)/MCL
BASOPHILS NFR BLD AUTO: 0.5 %
BILIRUB SERPL-MCNC: 0.5 MG/DL
BUN SERPL-MCNC: 16 MG/DL (ref 8.4–25.7)
CALCIUM SERPL-MCNC: 8.5 MG/DL (ref 8.8–10)
CHLORIDE SERPL-SCNC: 104 MMOL/L (ref 98–107)
CO2 SERPL-SCNC: 31 MMOL/L (ref 23–31)
CREAT SERPL-MCNC: 1.26 MG/DL (ref 0.72–1.25)
CREAT/UREA NIT SERPL: 13
EOSINOPHIL # BLD AUTO: 0.43 X10(3)/MCL (ref 0–0.9)
EOSINOPHIL NFR BLD AUTO: 5.9 %
ERYTHROCYTE [DISTWIDTH] IN BLOOD BY AUTOMATED COUNT: 19.5 % (ref 11.5–17)
GFR SERPLBLD CREATININE-BSD FMLA CKD-EPI: >60 ML/MIN/1.73/M2
GLOBULIN SER-MCNC: 3.2 GM/DL (ref 2.4–3.5)
GLUCOSE SERPL-MCNC: 89 MG/DL (ref 82–115)
HCT VFR BLD AUTO: 36.3 % (ref 42–52)
HGB BLD-MCNC: 10.8 G/DL (ref 14–18)
IMM GRANULOCYTES # BLD AUTO: 0.01 X10(3)/MCL (ref 0–0.04)
IMM GRANULOCYTES NFR BLD AUTO: 0.1 %
LYMPHOCYTES # BLD AUTO: 2.09 X10(3)/MCL (ref 0.6–4.6)
LYMPHOCYTES NFR BLD AUTO: 28.5 %
MAGNESIUM SERPL-MCNC: 2 MG/DL (ref 1.6–2.6)
MCH RBC QN AUTO: 24.7 PG (ref 27–31)
MCHC RBC AUTO-ENTMCNC: 29.8 G/DL (ref 33–36)
MCV RBC AUTO: 82.9 FL (ref 80–94)
MONOCYTES # BLD AUTO: 0.93 X10(3)/MCL (ref 0.1–1.3)
MONOCYTES NFR BLD AUTO: 12.7 %
NEUTROPHILS # BLD AUTO: 3.84 X10(3)/MCL (ref 2.1–9.2)
NEUTROPHILS NFR BLD AUTO: 52.3 %
NRBC BLD AUTO-RTO: 0 %
PHOSPHATE SERPL-MCNC: 3.8 MG/DL (ref 2.3–4.7)
PLATELET # BLD AUTO: 191 X10(3)/MCL (ref 130–400)
PMV BLD AUTO: 11.5 FL (ref 7.4–10.4)
POTASSIUM SERPL-SCNC: 3.9 MMOL/L (ref 3.5–5.1)
PROT SERPL-MCNC: 6.2 GM/DL (ref 5.8–7.6)
RBC # BLD AUTO: 4.38 X10(6)/MCL (ref 4.7–6.1)
SODIUM SERPL-SCNC: 142 MMOL/L (ref 136–145)
WBC # BLD AUTO: 7.34 X10(3)/MCL (ref 4.5–11.5)

## 2025-08-08 PROCEDURE — 94761 N-INVAS EAR/PLS OXIMETRY MLT: CPT

## 2025-08-08 PROCEDURE — 27000221 HC OXYGEN, UP TO 24 HOURS

## 2025-08-08 PROCEDURE — 25000003 PHARM REV CODE 250: Performed by: FAMILY MEDICINE

## 2025-08-08 PROCEDURE — 84100 ASSAY OF PHOSPHORUS: CPT | Performed by: FAMILY MEDICINE

## 2025-08-08 PROCEDURE — 11000001 HC ACUTE MED/SURG PRIVATE ROOM

## 2025-08-08 PROCEDURE — 36415 COLL VENOUS BLD VENIPUNCTURE: CPT | Performed by: FAMILY MEDICINE

## 2025-08-08 PROCEDURE — 99900035 HC TECH TIME PER 15 MIN (STAT)

## 2025-08-08 PROCEDURE — 94640 AIRWAY INHALATION TREATMENT: CPT | Mod: XB

## 2025-08-08 PROCEDURE — 25000242 PHARM REV CODE 250 ALT 637 W/ HCPCS: Performed by: FAMILY MEDICINE

## 2025-08-08 PROCEDURE — 63600175 PHARM REV CODE 636 W HCPCS: Performed by: FAMILY MEDICINE

## 2025-08-08 PROCEDURE — 83735 ASSAY OF MAGNESIUM: CPT | Performed by: FAMILY MEDICINE

## 2025-08-08 PROCEDURE — 85025 COMPLETE CBC W/AUTO DIFF WBC: CPT | Performed by: FAMILY MEDICINE

## 2025-08-08 PROCEDURE — 80053 COMPREHEN METABOLIC PANEL: CPT | Performed by: FAMILY MEDICINE

## 2025-08-08 RX ORDER — METOPROLOL TARTRATE 25 MG/1
25 TABLET, FILM COATED ORAL 2 TIMES DAILY
Status: DISCONTINUED | OUTPATIENT
Start: 2025-08-08 | End: 2025-08-08 | Stop reason: HOSPADM

## 2025-08-08 RX ORDER — AZITHROMYCIN 250 MG/1
TABLET, FILM COATED ORAL
Qty: 6 TABLET | Refills: 0 | Status: SHIPPED | OUTPATIENT
Start: 2025-08-08 | End: 2025-08-13

## 2025-08-08 RX ADMIN — BUDESONIDE INHALATION 0.5 MG: 0.5 SUSPENSION RESPIRATORY (INHALATION) at 07:08

## 2025-08-08 RX ADMIN — METOPROLOL TARTRATE 25 MG: 25 TABLET, FILM COATED ORAL at 09:08

## 2025-08-08 RX ADMIN — LEVALBUTEROL HYDROCHLORIDE 1.25 MG: 1.25 SOLUTION RESPIRATORY (INHALATION) at 07:08

## 2025-08-08 RX ADMIN — GABAPENTIN 100 MG: 100 CAPSULE ORAL at 09:08

## 2025-08-08 RX ADMIN — CEFTRIAXONE SODIUM 1 G: 1 INJECTION, POWDER, FOR SOLUTION INTRAMUSCULAR; INTRAVENOUS at 07:08

## 2025-08-08 RX ADMIN — HYDROCODONE BITARTRATE AND ACETAMINOPHEN 1 TABLET: 10; 325 TABLET ORAL at 06:08

## 2025-08-08 RX ADMIN — FENOFIBRATE 145 MG: 145 TABLET, FILM COATED ORAL at 09:08

## 2025-08-08 RX ADMIN — ATORVASTATIN CALCIUM 20 MG: 20 TABLET, FILM COATED ORAL at 09:08

## 2025-08-08 RX ADMIN — FUROSEMIDE 20 MG: 10 INJECTION, SOLUTION INTRAMUSCULAR; INTRAVENOUS at 07:08

## 2025-08-08 RX ADMIN — AZITHROMYCIN DIHYDRATE 500 MG: 500 INJECTION, POWDER, LYOPHILIZED, FOR SOLUTION INTRAVENOUS at 09:08

## 2025-08-08 RX ADMIN — PRIMIDONE 100 MG: 50 TABLET ORAL at 09:08

## 2025-08-08 RX ADMIN — PANTOPRAZOLE SODIUM 40 MG: 40 TABLET, DELAYED RELEASE ORAL at 09:08

## 2025-08-12 LAB
BACTERIA BLD CULT: NORMAL
BACTERIA BLD CULT: NORMAL

## 2025-08-13 ENCOUNTER — HOSPITAL ENCOUNTER (OUTPATIENT)
Dept: WOUND CARE | Facility: HOSPITAL | Age: 70
Discharge: HOME | End: 2025-08-13
Attending: NURSE PRACTITIONER
Payer: MEDICARE

## 2025-08-13 VITALS
DIASTOLIC BLOOD PRESSURE: 79 MMHG | HEART RATE: 63 BPM | HEIGHT: 70 IN | BODY MASS INDEX: 30.36 KG/M2 | WEIGHT: 212.06 LBS | RESPIRATION RATE: 18 BRPM | SYSTOLIC BLOOD PRESSURE: 176 MMHG

## 2025-08-13 DIAGNOSIS — L97.512 NEUROPATHIC ULCER OF TOE, RIGHT, WITH FAT LAYER EXPOSED: Primary | ICD-10-CM

## 2025-08-13 DIAGNOSIS — S91.101D OPEN WOUND OF RIGHT GREAT TOE, SUBSEQUENT ENCOUNTER: ICD-10-CM

## 2025-08-13 DIAGNOSIS — S91.102A OPEN WOUND OF LEFT GREAT TOE, INITIAL ENCOUNTER: ICD-10-CM

## 2025-08-13 PROCEDURE — 97597 DBRDMT OPN WND 1ST 20 CM/<: CPT

## 2025-08-13 PROCEDURE — 99213 OFFICE O/P EST LOW 20 MIN: CPT

## 2025-08-13 PROCEDURE — 27000999 HC MEDICAL RECORD PHOTO DOCUMENTATION
